# Patient Record
Sex: FEMALE | Race: WHITE | NOT HISPANIC OR LATINO | Employment: FULL TIME | URBAN - METROPOLITAN AREA
[De-identification: names, ages, dates, MRNs, and addresses within clinical notes are randomized per-mention and may not be internally consistent; named-entity substitution may affect disease eponyms.]

---

## 2018-10-08 ENCOUNTER — NON-PROVIDER VISIT (OUTPATIENT)
Dept: OCCUPATIONAL MEDICINE | Facility: CLINIC | Age: 38
End: 2018-10-08

## 2018-10-08 ENCOUNTER — DOCUMENTATION (OUTPATIENT)
Dept: OCCUPATIONAL MEDICINE | Facility: CLINIC | Age: 38
End: 2018-10-08

## 2018-10-08 DIAGNOSIS — Z02.89 VISIT FOR OCCUPATIONAL HEALTH EXAMINATION: ICD-10-CM

## 2018-10-08 DIAGNOSIS — Z02.1 PRE-EMPLOYMENT DRUG SCREENING: ICD-10-CM

## 2018-10-08 LAB
AMP AMPHETAMINE: NORMAL
BAR BARBITURATES: NORMAL
BZO BENZODIAZEPINES: NORMAL
COC COCAINE: NORMAL
INT CON NEG: NORMAL
INT CON POS: NORMAL
MDMA ECSTASY: NORMAL
MET METHAMPHETAMINES: NORMAL
MTD METHADONE: NORMAL
OPI OPIATES: NORMAL
OXY OXYCODONE: NORMAL
PCP PHENCYCLIDINE: NORMAL
POC URINE DRUG SCREEN OCDRS: NORMAL
THC: NORMAL

## 2018-10-08 PROCEDURE — 94375 RESPIRATORY FLOW VOLUME LOOP: CPT | Performed by: PREVENTIVE MEDICINE

## 2018-10-08 PROCEDURE — 80305 DRUG TEST PRSMV DIR OPT OBS: CPT | Performed by: PREVENTIVE MEDICINE

## 2019-03-07 ENCOUNTER — APPOINTMENT (OUTPATIENT)
Dept: RADIOLOGY | Facility: MEDICAL CENTER | Age: 39
End: 2019-03-07
Attending: EMERGENCY MEDICINE
Payer: COMMERCIAL

## 2019-03-07 ENCOUNTER — HOSPITAL ENCOUNTER (EMERGENCY)
Facility: MEDICAL CENTER | Age: 39
End: 2019-03-07
Attending: EMERGENCY MEDICINE
Payer: COMMERCIAL

## 2019-03-07 VITALS
BODY MASS INDEX: 29.44 KG/M2 | DIASTOLIC BLOOD PRESSURE: 48 MMHG | HEIGHT: 66 IN | SYSTOLIC BLOOD PRESSURE: 101 MMHG | OXYGEN SATURATION: 100 % | WEIGHT: 183.2 LBS | TEMPERATURE: 99 F | HEART RATE: 88 BPM | RESPIRATION RATE: 16 BRPM

## 2019-03-07 DIAGNOSIS — N92.0 MENORRHAGIA WITH REGULAR CYCLE: ICD-10-CM

## 2019-03-07 DIAGNOSIS — D50.9 IRON DEFICIENCY ANEMIA, UNSPECIFIED IRON DEFICIENCY ANEMIA TYPE: ICD-10-CM

## 2019-03-07 LAB
ABO GROUP BLD: NORMAL
ABO GROUP BLD: NORMAL
ANION GAP SERPL CALC-SCNC: 8 MMOL/L (ref 0–11.9)
ANISOCYTOSIS BLD QL SMEAR: ABNORMAL
BARCODED ABORH UBTYP: 9500
BARCODED PRD CODE UBPRD: NORMAL
BARCODED UNIT NUM UBUNT: NORMAL
BASOPHILS # BLD AUTO: 0.9 % (ref 0–1.8)
BASOPHILS # BLD: 0.06 K/UL (ref 0–0.12)
BLD GP AB SCN SERPL QL: NORMAL
BUN SERPL-MCNC: 15 MG/DL (ref 8–22)
CALCIUM SERPL-MCNC: 8.9 MG/DL (ref 8.5–10.5)
CHLORIDE SERPL-SCNC: 107 MMOL/L (ref 96–112)
CO2 SERPL-SCNC: 20 MMOL/L (ref 20–33)
COMPONENT R 8504R: NORMAL
CREAT SERPL-MCNC: 0.64 MG/DL (ref 0.5–1.4)
EKG IMPRESSION: NORMAL
EOSINOPHIL # BLD AUTO: 0 K/UL (ref 0–0.51)
EOSINOPHIL NFR BLD: 0 % (ref 0–6.9)
ERYTHROCYTE [DISTWIDTH] IN BLOOD BY AUTOMATED COUNT: 50.4 FL (ref 35.9–50)
GLUCOSE SERPL-MCNC: 130 MG/DL (ref 65–99)
HCG SERPL QL: NEGATIVE
HCT VFR BLD AUTO: 24.6 % (ref 37–47)
HGB BLD-MCNC: 6.6 G/DL (ref 12–16)
HYPOCHROMIA BLD QL SMEAR: ABNORMAL
LYMPHOCYTES # BLD AUTO: 0.78 K/UL (ref 1–4.8)
LYMPHOCYTES NFR BLD: 10.9 % (ref 22–41)
MANUAL DIFF BLD: NORMAL
MCH RBC QN AUTO: 19.9 PG (ref 27–33)
MCHC RBC AUTO-ENTMCNC: 26.8 G/DL (ref 33.6–35)
MCV RBC AUTO: 74.1 FL (ref 81.4–97.8)
MICROCYTES BLD QL SMEAR: ABNORMAL
MONOCYTES # BLD AUTO: 0.19 K/UL (ref 0–0.85)
MONOCYTES NFR BLD AUTO: 2.7 % (ref 0–13.4)
MORPHOLOGY BLD-IMP: NORMAL
MYELOCYTES NFR BLD MANUAL: 0.9 %
NEUTROPHILS # BLD AUTO: 6.09 K/UL (ref 2–7.15)
NEUTROPHILS NFR BLD: 84.6 % (ref 44–72)
NRBC # BLD AUTO: 0 K/UL
NRBC BLD-RTO: 0 /100 WBC
OVALOCYTES BLD QL SMEAR: NORMAL
PLATELET # BLD AUTO: 229 K/UL (ref 164–446)
PLATELET BLD QL SMEAR: NORMAL
POIKILOCYTOSIS BLD QL SMEAR: NORMAL
POTASSIUM SERPL-SCNC: 3.4 MMOL/L (ref 3.6–5.5)
PRODUCT TYPE UPROD: NORMAL
RBC # BLD AUTO: 3.32 M/UL (ref 4.2–5.4)
RBC BLD AUTO: PRESENT
RH BLD: NORMAL
RH BLD: NORMAL
SCHISTOCYTES BLD QL SMEAR: NORMAL
SODIUM SERPL-SCNC: 135 MMOL/L (ref 135–145)
TROPONIN I SERPL-MCNC: <0.01 NG/ML (ref 0–0.04)
UNIT STATUS USTAT: NORMAL
WBC # BLD AUTO: 7.2 K/UL (ref 4.8–10.8)

## 2019-03-07 PROCEDURE — 86901 BLOOD TYPING SEROLOGIC RH(D): CPT

## 2019-03-07 PROCEDURE — 93005 ELECTROCARDIOGRAM TRACING: CPT | Performed by: EMERGENCY MEDICINE

## 2019-03-07 PROCEDURE — 84703 CHORIONIC GONADOTROPIN ASSAY: CPT

## 2019-03-07 PROCEDURE — 86923 COMPATIBILITY TEST ELECTRIC: CPT

## 2019-03-07 PROCEDURE — 36430 TRANSFUSION BLD/BLD COMPNT: CPT

## 2019-03-07 PROCEDURE — 80048 BASIC METABOLIC PNL TOTAL CA: CPT

## 2019-03-07 PROCEDURE — 93005 ELECTROCARDIOGRAM TRACING: CPT

## 2019-03-07 PROCEDURE — 84484 ASSAY OF TROPONIN QUANT: CPT

## 2019-03-07 PROCEDURE — 86900 BLOOD TYPING SEROLOGIC ABO: CPT

## 2019-03-07 PROCEDURE — 700105 HCHG RX REV CODE 258: Performed by: EMERGENCY MEDICINE

## 2019-03-07 PROCEDURE — 71045 X-RAY EXAM CHEST 1 VIEW: CPT

## 2019-03-07 PROCEDURE — 85027 COMPLETE CBC AUTOMATED: CPT

## 2019-03-07 PROCEDURE — 86850 RBC ANTIBODY SCREEN: CPT

## 2019-03-07 PROCEDURE — P9016 RBC LEUKOCYTES REDUCED: HCPCS

## 2019-03-07 PROCEDURE — 85007 BL SMEAR W/DIFF WBC COUNT: CPT

## 2019-03-07 PROCEDURE — 99285 EMERGENCY DEPT VISIT HI MDM: CPT

## 2019-03-07 RX ORDER — FERROUS SULFATE 325(65) MG
325 TABLET ORAL DAILY
Qty: 30 TAB | Refills: 0 | Status: ON HOLD | OUTPATIENT
Start: 2019-03-07 | End: 2019-03-21

## 2019-03-07 RX ORDER — DOCUSATE SODIUM 100 MG/1
100 CAPSULE, LIQUID FILLED ORAL 2 TIMES DAILY
Qty: 60 CAP | Refills: 0 | Status: SHIPPED | OUTPATIENT
Start: 2019-03-07 | End: 2019-03-15 | Stop reason: CLARIF

## 2019-03-07 RX ORDER — SODIUM CHLORIDE 9 MG/ML
1000 INJECTION, SOLUTION INTRAVENOUS ONCE
Status: COMPLETED | OUTPATIENT
Start: 2019-03-07 | End: 2019-03-07

## 2019-03-07 RX ADMIN — SODIUM CHLORIDE 1000 ML: 9 INJECTION, SOLUTION INTRAVENOUS at 16:05

## 2019-03-07 ASSESSMENT — LIFESTYLE VARIABLES: DO YOU DRINK ALCOHOL: NO

## 2019-03-07 NOTE — ED NOTES
Pt ambulates to triage with c/c SOB & dizziness.  Pt reports similar feeling with low H&H due to heavy periods years ago.  A&ox4.  Pt to room 65.

## 2019-03-08 NOTE — ED PROVIDER NOTES
"ED Provider Note    CHIEF COMPLAINT  Chief Complaint   Patient presents with   • Shortness of Breath     started today    • Dizziness       HPI  Stacey Francisco is a 39 y.o. female who presents with dizziness and shortness of breath.  Patient is a history of anemia related to heavy menses.  She is never been transfused.  She has had a hemoglobin as low as 7 in the past.  She started menstruating 4 days ago.  This is current.  This is a typical heavy period for her.  Today she went to [facility].  She felt short of breath and weak.  She ended up not skiing and came to the hospital because she felt so bad.  She is felt lightheaded and things going dark like she might pass out.    REVIEW OF SYSTEMS  As per HPI, otherwise a 10 point review of systems is negative    PAST MEDICAL HISTORY  Menorrhagia  Anemia    SOCIAL HISTORY  She is a Nurse    SURGICAL HISTORY  No past surgical history on file.    CURRENT MEDICATIONS  Home Medications     Reviewed by Janice Tinoco R.N. (Registered Nurse) on 03/07/19 at 1449  Med List Status: Not Addressed   Medication Last Dose Status        Patient Gerry Taking any Medications                       ALLERGIES  No Known Allergies    PHYSICAL EXAM  VITAL SIGNS: /80   Pulse (!) 121   Temp 36.2 °C (97.2 °F) (Temporal)   Resp 20   Ht 1.676 m (5' 6\")   Wt 83.1 kg (183 lb 3.2 oz)   SpO2 100%   BMI 29.57 kg/m²    Constitutional: Awake and alert  HENT:  Atraumatic, Normocephalic.Oropharynx dry mucus membranes, Nose normal inspection.   Eyes: Normal inspection  Neck: Supple  Cardiovascular: Tachycardia heart rate, Normal rhythm.  Symmetric peripheral pulses.   Thorax & Lungs: No respiratory distress, No wheezing, No rales, No rhonchi, No chest tenderness.   Abdomen: Bowel sounds normal, soft, non-distended, nontender, no mass  Skin: Warm, Dry, No rash.   Back: No tenderness, No CVA tenderness.   Extremities: No clubbing, cyanosis, edema, no Homans or cords   Neurologic: Grossly " normal   Psychiatric: Anxious appearing    RADIOLOGY/PROCEDURES  DX-CHEST-PORTABLE (1 VIEW)   Final Result      No acute cardiopulmonary disease evident.           Imaging is interpreted by radiologist    Labs:  Results for orders placed or performed during the hospital encounter of 03/07/19   HCG QUAL SERUM   Result Value Ref Range    Beta-Hcg Qualitative Serum Negative Negative   CBC WITH DIFFERENTIAL   Result Value Ref Range    WBC 7.2 4.8 - 10.8 K/uL    RBC 3.32 (L) 4.20 - 5.40 M/uL    Hemoglobin 6.6 (L) 12.0 - 16.0 g/dL    Hematocrit 24.6 (L) 37.0 - 47.0 %    MCV 74.1 (L) 81.4 - 97.8 fL    MCH 19.9 (L) 27.0 - 33.0 pg    MCHC 26.8 (L) 33.6 - 35.0 g/dL    RDW 50.4 (H) 35.9 - 50.0 fL    Platelet Count 229 164 - 446 K/uL    Neutrophils-Polys 84.60 (H) 44.00 - 72.00 %    Lymphocytes 10.90 (L) 22.00 - 41.00 %    Monocytes 2.70 0.00 - 13.40 %    Eosinophils 0.00 0.00 - 6.90 %    Basophils 0.90 0.00 - 1.80 %    Nucleated RBC 0.00 /100 WBC    Neutrophils (Absolute) 6.09 2.00 - 7.15 K/uL    Lymphs (Absolute) 0.78 (L) 1.00 - 4.80 K/uL    Monos (Absolute) 0.19 0.00 - 0.85 K/uL    Eos (Absolute) 0.00 0.00 - 0.51 K/uL    Baso (Absolute) 0.06 0.00 - 0.12 K/uL    NRBC (Absolute) 0.00 K/uL    Hypochromia 1+     Anisocytosis 1+     Microcytosis 1+    BASIC METABOLIC PANEL   Result Value Ref Range    Sodium 135 135 - 145 mmol/L    Potassium 3.4 (L) 3.6 - 5.5 mmol/L    Chloride 107 96 - 112 mmol/L    Co2 20 20 - 33 mmol/L    Glucose 130 (H) 65 - 99 mg/dL    Bun 15 8 - 22 mg/dL    Creatinine 0.64 0.50 - 1.40 mg/dL    Calcium 8.9 8.5 - 10.5 mg/dL    Anion Gap 8.0 0.0 - 11.9   TROPONIN   Result Value Ref Range    Troponin I <0.01 0.00 - 0.04 ng/mL   ESTIMATED GFR   Result Value Ref Range    GFR If African American >60 >60 mL/min/1.73 m 2    GFR If Non African American >60 >60 mL/min/1.73 m 2   DIFFERENTIAL MANUAL   Result Value Ref Range    Myelocytes 0.90 %    Manual Diff Status PERFORMED    PERIPHERAL SMEAR REVIEW   Result Value  Ref Range    Peripheral Smear Review see below    PLATELET ESTIMATE   Result Value Ref Range    Plt Estimation Normal    MORPHOLOGY   Result Value Ref Range    RBC Morphology Present     Poikilocytosis 2+     Ovalocytes 2+     Schistocytes 1+    EKG (NOW)   Result Value Ref Range    Report       [facility]    Test Date:  2019  Pt Name:    ALICJA DEL ROSARIO               Department: ER  MRN:        [id number]                      Room:  Gender:     Female                       Technician: [id number]  :        1980                   Requested By:ER TRIAGE PROTOCOL  Order #:    [id number]                    Reading MD: QIANA SOLER MD    Measurements  Intervals                                Axis  Rate:       121                          P:          72  KS:         148                          QRS:        -12  QRSD:       76                           T:          56  QT:         324  QTc:        460    Interpretive Statements  SINUS TACHYCARDIA  No previous ECG available for comparison    Electronically Signed On 3-7-2019 16:33:30 PST by QIANA SOLER MD         Medications   NS infusion 1,000 mL (1,000 mL Intravenous New Bag 3/7/19 1605)       HYDRATION: Based on the patient's presentation of Tachycardia the patient was given IV fluids. IV Hydration was used because oral hydration was not as rapid as required. Upon recheck following hydration, the patient was Improved.    COURSE & MEDICAL DECISION MAKING  Patient presents to the ER with history of heavy menstrual bleeding and now symptoms most consistent with anemia.  She was tachycardic.  She was hydrated with normal saline.  Workup was undertaken with regards to her symptoms.  She was not identified to have alternative pathology, but was noted to have remarkable anemia.    This is a chronic problem for the patient.  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  She has a gynecologist in [place].  She had a pelvic ultrasound a few  months ago that just showed a small ovarian cyst.  She has considered ablation.  She has tried hormones in the past, but she has not tolerated any of these because of side effects.  She is not interested in additional hormone therapy.  Because of her symptoms and the severity of her anemia a transfusion is recommended.    I have explained to the patient the risks and benefits of transfusion of blood products.  This includes, as appropriate, the risk of mild allergic reaction, hemolytic reaction, transfusion-associated lung injury, febrile reactions, circulatory or iron overload, and infection.    We discussed possible alternatives and their risks, including directed donation, autologous transfusion, and no transfusion, including IV or oral iron supplementation, as appropriate.  I believe the patient understands the risks and benefits and was able to express understanding.    I ordered 1 unit of packed red blood cells to be transfused.  Patient will be prescribed oral iron supplementation and Colace.  Next month she plans to go back home.  She will make an appointment with her gynecologist.  She has discussed ablation in the past with him.  I have given precautions for the patient to return the ER for recurrent symptoms, difficult he breathing, fevers or concern.      FINAL IMPRESSION  1.  Critical anemia requiring transfusion secondary to menorrhagia        This dictation was created using voice recognition software. The accuracy of the dictation is limited to the abilities of the software.  The nursing notes were reviewed and certain aspects of this information were incorporated into this note.      Electronically signed by: Willie Thakur, 3/7/2019 4:04 PM

## 2019-03-08 NOTE — ED NOTES
Report from Niurka CLIFFORD. Patient receiving blood transfusion at this time. On monitor, up for DC after blood transfusion complete

## 2019-03-08 NOTE — ED NOTES
Pt blood infusing at this time without difficulty.  No S&S of infiltration, overload, or reactions.  Will continue to monitor.  Pt tolerating without difficulty.

## 2019-03-08 NOTE — ED NOTES
Pt verbalizes understanding of discharge and follow-up instructions.  PIV removed.  Given Rx X2.  VSS.  All questions answered. When patient stood, she appeared a little unsteady on her feet and stated that she was a little dizzy. I advised patient to sit down and get herself straight, she did so and stated that she is feeling better and says that she is ok to go home and does not want to stay. Vital signs re-checked, all WNL. Advised patient to check back in if she finds it too difficult to get to her car and to pull over immediately if she begins to feel dizzy or has tunnel vision while she is driving. Patient verbalizes understanding of these instructions and insists that she is comfortable going home in her current condition.

## 2019-03-08 NOTE — ED NOTES
Patient updated on plan of care.  Pt IV infusing at this time without difficulty.  No S&S of infiltration, overload, or reactions.  Will continue to monitor.

## 2019-03-10 ENCOUNTER — HOSPITAL ENCOUNTER (EMERGENCY)
Facility: MEDICAL CENTER | Age: 39
End: 2019-03-10
Attending: EMERGENCY MEDICINE
Payer: COMMERCIAL

## 2019-03-10 VITALS
HEIGHT: 66 IN | TEMPERATURE: 98.5 F | SYSTOLIC BLOOD PRESSURE: 91 MMHG | RESPIRATION RATE: 18 BRPM | BODY MASS INDEX: 29.09 KG/M2 | WEIGHT: 181 LBS | DIASTOLIC BLOOD PRESSURE: 42 MMHG | HEART RATE: 85 BPM | OXYGEN SATURATION: 98 %

## 2019-03-10 DIAGNOSIS — D64.9 SYMPTOMATIC ANEMIA: ICD-10-CM

## 2019-03-10 DIAGNOSIS — N93.8 DYSFUNCTIONAL UTERINE BLEEDING: ICD-10-CM

## 2019-03-10 LAB
ABO GROUP BLD: NORMAL
BARCODED ABORH UBTYP: 5100
BARCODED ABORH UBTYP: 5100
BARCODED PRD CODE UBPRD: NORMAL
BARCODED PRD CODE UBPRD: NORMAL
BARCODED UNIT NUM UBUNT: NORMAL
BARCODED UNIT NUM UBUNT: NORMAL
BASOPHILS # BLD AUTO: 0.4 % (ref 0–1.8)
BASOPHILS # BLD: 0.03 K/UL (ref 0–0.12)
BLD GP AB SCN SERPL QL: NORMAL
COMPONENT R 8504R: NORMAL
COMPONENT R 8504R: NORMAL
EOSINOPHIL # BLD AUTO: 0.14 K/UL (ref 0–0.51)
EOSINOPHIL NFR BLD: 1.7 % (ref 0–6.9)
ERYTHROCYTE [DISTWIDTH] IN BLOOD BY AUTOMATED COUNT: 54 FL (ref 35.9–50)
HCG SERPL QL: NEGATIVE
HCT VFR BLD AUTO: 24.5 % (ref 37–47)
HGB BLD-MCNC: 6.7 G/DL (ref 12–16)
IMM GRANULOCYTES # BLD AUTO: 0.02 K/UL (ref 0–0.11)
IMM GRANULOCYTES NFR BLD AUTO: 0.2 % (ref 0–0.9)
LYMPHOCYTES # BLD AUTO: 2.03 K/UL (ref 1–4.8)
LYMPHOCYTES NFR BLD: 24.1 % (ref 22–41)
MCH RBC QN AUTO: 20.6 PG (ref 27–33)
MCHC RBC AUTO-ENTMCNC: 27.3 G/DL (ref 33.6–35)
MCV RBC AUTO: 75.4 FL (ref 81.4–97.8)
MONOCYTES # BLD AUTO: 1.01 K/UL (ref 0–0.85)
MONOCYTES NFR BLD AUTO: 12 % (ref 0–13.4)
NEUTROPHILS # BLD AUTO: 5.21 K/UL (ref 2–7.15)
NEUTROPHILS NFR BLD: 61.6 % (ref 44–72)
NRBC # BLD AUTO: 0 K/UL
NRBC BLD-RTO: 0 /100 WBC
PLATELET # BLD AUTO: 228 K/UL (ref 164–446)
PRODUCT TYPE UPROD: NORMAL
PRODUCT TYPE UPROD: NORMAL
RBC # BLD AUTO: 3.25 M/UL (ref 4.2–5.4)
RH BLD: NORMAL
UNIT STATUS USTAT: NORMAL
UNIT STATUS USTAT: NORMAL
WBC # BLD AUTO: 8.4 K/UL (ref 4.8–10.8)

## 2019-03-10 PROCEDURE — 84703 CHORIONIC GONADOTROPIN ASSAY: CPT

## 2019-03-10 PROCEDURE — 86850 RBC ANTIBODY SCREEN: CPT

## 2019-03-10 PROCEDURE — 86900 BLOOD TYPING SEROLOGIC ABO: CPT

## 2019-03-10 PROCEDURE — P9016 RBC LEUKOCYTES REDUCED: HCPCS | Mod: 91

## 2019-03-10 PROCEDURE — 700105 HCHG RX REV CODE 258: Performed by: EMERGENCY MEDICINE

## 2019-03-10 PROCEDURE — 36415 COLL VENOUS BLD VENIPUNCTURE: CPT

## 2019-03-10 PROCEDURE — 86901 BLOOD TYPING SEROLOGIC RH(D): CPT

## 2019-03-10 PROCEDURE — 700111 HCHG RX REV CODE 636 W/ 250 OVERRIDE (IP)

## 2019-03-10 PROCEDURE — 99285 EMERGENCY DEPT VISIT HI MDM: CPT

## 2019-03-10 PROCEDURE — 85025 COMPLETE CBC W/AUTO DIFF WBC: CPT

## 2019-03-10 PROCEDURE — 86923 COMPATIBILITY TEST ELECTRIC: CPT

## 2019-03-10 PROCEDURE — 96374 THER/PROPH/DIAG INJ IV PUSH: CPT

## 2019-03-10 PROCEDURE — 36430 TRANSFUSION BLD/BLD COMPNT: CPT

## 2019-03-10 RX ORDER — SODIUM CHLORIDE 9 MG/ML
INJECTION, SOLUTION INTRAVENOUS CONTINUOUS
Status: DISCONTINUED | OUTPATIENT
Start: 2019-03-10 | End: 2019-03-10 | Stop reason: HOSPADM

## 2019-03-10 RX ORDER — ACETAMINOPHEN 650 MG/1
SUPPOSITORY RECTAL
Status: DISCONTINUED
Start: 2019-03-10 | End: 2019-03-10 | Stop reason: HOSPADM

## 2019-03-10 RX ORDER — ONDANSETRON 2 MG/ML
4 INJECTION INTRAMUSCULAR; INTRAVENOUS ONCE
Status: COMPLETED | OUTPATIENT
Start: 2019-03-10 | End: 2019-03-10

## 2019-03-10 RX ADMIN — ONDANSETRON 4 MG: 2 INJECTION INTRAMUSCULAR; INTRAVENOUS at 03:45

## 2019-03-10 RX ADMIN — SODIUM CHLORIDE: 9 INJECTION, SOLUTION INTRAVENOUS at 01:45

## 2019-03-10 NOTE — ED PROVIDER NOTES
"ED Provider Note    Scribed for Lynn Angulo M.D. by Mary Panda. 3/10/2019  1:19 AM    Means of arrival: Walk-in  History obtained from: Patient  History limited by: None      CHIEF COMPLAINT  Chief Complaint   Patient presents with   • Dizziness       HPI  Ana Crisosotmo is a 39 y.o. Female with a history of menorrhagia who presents to the Emergency Department for dizziness onset one week ago. Patient reports she is passing blood clots, however at bedside, she notes less clotting as compared to earlier this week. She endorses shortness of breath upon exertion. 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 She states she took iron supplements as needed.     Patient was seen here two days ago for lightheadedness and tachycardia, and was found to be anemic with hemoglobin of 6.6. She was then transfused with one unit of blood. At bedside, patient is still symptomatic and heavily bleeding. She is negative for fever.    REVIEW OF SYSTEMS  Pertinent positive include menorrhagia, dizziness, shortness of breath upon exertion. Pertinent negative include fever. All other systems reviewed and are negative.      PAST MEDICAL HISTORY   has a past medical history of Anemia.    SOCIAL HISTORY  No social history noted.     SURGICAL HISTORY  patient denies any surgical history    CURRENT MEDICATIONS  Home Medications     Reviewed by Marcelle Collado R.N. (Registered Nurse) on 03/10/19 at 0108  Med List Status: <None>   Medication Last Dose Status   docusate sodium (COLACE) 100 MG Cap  Active   ferrous sulfate 325 (65 Fe) MG tablet  Active                ALLERGIES  No Known Allergies    PHYSICAL EXAM   VITAL SIGNS: /61   Pulse 89   Temp 36.9 °C (98.4 °F) (Temporal)   Resp 17   Ht 1.676 m (5' 6\")   Wt 82.1 kg (181 lb)   SpO2 99%   BMI 29.21 kg/m²      Constitutional: Alert in no " apparent distress.  HENT: Normocephalic, Atraumatic. Bilateral external ears normal. Nose normal.  Moist mucous membranes.  Oropharynx clear.  Eyes: Pupils are equal and reactive. Conjunctiva normal.   Neck: Supple, full range of motion  Heart: Regular rate and rhythm.  No murmurs.    Lungs: No respiratory distress, normal work of breathing. Lungs clear to auscultation bilaterally.  Abdomen Soft, no distention.  No tenderness to palpation.  Musculoskeletal: Atraumatic. No obvious deformities noted.  No lower extremity edema.  Skin: Warm, Dry.  Pale. No erythema, No rash.   Neurologic: Alert and oriented x3. Moving all extremities spontaneously without focal deficits.  Psychiatric: Affect normal, Mood normal, Appears appropriate and not intoxicated.      DIAGNOSTIC STUDIES    LABS  Personally reviewed by me  Labs Reviewed   CBC WITH DIFFERENTIAL - Abnormal; Notable for the following:        Result Value    RBC 3.25 (*)     Hemoglobin 6.7 (*)     Hematocrit 24.5 (*)     MCV 75.4 (*)     MCH 20.6 (*)     MCHC 27.3 (*)     RDW 54.0 (*)     Monos (Absolute) 1.01 (*)     All other components within normal limits   COD (ADULT)   HCG QUAL SERUM   RELEASE RED BLOOD CELLS   RELEASE RED BLOOD CELLS   TRANSFUSE RED BLOOD CELLS-NURSING COMMUNICATION   TRANSFUSE RED BLOOD CELLS-NURSING COMMUNICATION       ED COURSE  Vitals:    03/10/19 0430 03/10/19 0433 03/10/19 0500 03/10/19 0530   BP:  (!) 91/42     Pulse: 85 87 79 85   Resp: (!) 81 18     Temp:  36.9 °C (98.5 °F)     TempSrc:       SpO2: 97% 98% 97% 98%   Weight:       Height:             Medications administered:  Medications   NS infusion ( Intravenous Stopped 3/10/19 0300)   ACETAMINOPHEN 650 MG LA SUPP (not administered)   ondansetron (ZOFRAN) syringe/vial injection 4 mg (4 mg Intravenous Given 3/10/19 0345)     Patient was given IV fluids for possible dehydration.  IV hydration was used because oral hydration was not adequate alone.  Following fluid administration  patient's symptoms and vital signs were improved.    Old records personally reviewed:  Patient was seen here two days ago for lightheadedness and tachycardia, and was found to be anemic with hemoglobin of 6.6. Cardiac workup was unremarkable.  She was then transfused with one unit of blood.    1:19 AM Patient seen and examined at bedside. Ordered for CBC, COD, NS infusion and HCG qualitative serum to evaluate. Patient will be treated with acetaminophen 650 mg, Zofran 4 mg, and NS infusion for her symptoms.     MEDICAL DECISION MAKING  Patient with history of menorrhagia who presents with continued lightheadedness and shortness of breath after recently being seen and transfused due to low blood counts.  She is afebrile with reassuring vitals on arrival.  Abdominal exam is benign without concern for ovarian torsion, TOA, PID.  Labs demonstrate continued anemia despite transfusion 2 days prior.  Patient is not pregnant.  Plan for further transfusion and placement on Aygestin with close follow up with gynecology.      5:21 AM - Upon reassessment following transfusion, patient is resting comfortably with normal vital signs.  No new complaints at this time.  Discussed results with patient and/or family as well as importance of primary care follow up.  Patient understands plan of care and strict return precautions for new or changing symptoms.     I discussed the patient with Dr. Donato, gynecology who will follow the patient up in her clinic.     DISPOSITION:  Patient will be discharged home in stable condition.    FOLLOW UP:  Miranda Donato M.D.  [address]  [facility] [address] [phone]    Schedule an appointment as soon as possible for a visit       [facility], Emergency Dept  [address] [address] [phone]    If symptoms worsen      OUTPATIENT MEDICATIONS:  Discharge Medication List as of 3/10/2019  5:49 AM      START taking these medications    Details    norethindrone (AYGESTIN) 5 MG tablet Take 2 Tabs by mouth every day for 10 days., Disp-20 Tab, R-0, Print Rx Paper             IMPRESSION  (D64.9) Symptomatic anemia  (N93.8) Dysfunctional uterine bleeding    Results, diagnoses, and treatment options were discussed with the patient and/or family. Patient verbalized understanding of plan of care.    Discharge Medication List as of 3/10/2019  5:49 AM      START taking these medications    Details   norethindrone (AYGESTIN) 5 MG tablet Take 2 Tabs by mouth every day for 10 days., Disp-20 Tab, R-0, Print Rx Paper                  IMary (Scribe), am scribing for, and in the presence of, Lynn Angulo M.D..    Electronically signed by: Mary Panda (Scribe), 3/10/2019    ILynn M.D. personally performed the services described in this documentation, as scribed by Mary Panda in my presence, and it is both accurate and complete. C.     The note accurately reflects work and decisions made by me.  Lynn Angulo  3/10/2019  7:08 PM

## 2019-03-10 NOTE — ED TRIAGE NOTES
"Chief Complaint   Patient presents with   • Dizziness     Blood pressure 120/61, pulse 89, temperature 36.9 °C (98.4 °F), temperature source Temporal, resp. rate 17, height 1.676 m (5' 6\"), weight 82.1 kg (181 lb), SpO2 99 %.      Pt ambulatory to [place] [id number], received blood transfusion approx two days ago due to low h/h.   "

## 2019-03-12 ENCOUNTER — APPOINTMENT (OUTPATIENT)
Dept: RADIOLOGY | Facility: MEDICAL CENTER | Age: 39
End: 2019-03-12
Attending: EMERGENCY MEDICINE
Payer: COMMERCIAL

## 2019-03-12 ENCOUNTER — HOSPITAL ENCOUNTER (EMERGENCY)
Facility: MEDICAL CENTER | Age: 39
End: 2019-03-12
Attending: EMERGENCY MEDICINE
Payer: COMMERCIAL

## 2019-03-12 VITALS
DIASTOLIC BLOOD PRESSURE: 70 MMHG | WEIGHT: 180 LBS | HEIGHT: 66 IN | HEART RATE: 89 BPM | OXYGEN SATURATION: 97 % | BODY MASS INDEX: 28.93 KG/M2 | TEMPERATURE: 97.7 F | RESPIRATION RATE: 18 BRPM | SYSTOLIC BLOOD PRESSURE: 119 MMHG

## 2019-03-12 DIAGNOSIS — N92.0 MENORRHAGIA WITH REGULAR CYCLE: ICD-10-CM

## 2019-03-12 LAB
ANION GAP SERPL CALC-SCNC: 10 MMOL/L (ref 0–11.9)
ANISOCYTOSIS BLD QL SMEAR: ABNORMAL
BASOPHILS # BLD AUTO: 0.2 % (ref 0–1.8)
BASOPHILS # BLD: 0.02 K/UL (ref 0–0.12)
BUN SERPL-MCNC: 11 MG/DL (ref 8–22)
CALCIUM SERPL-MCNC: 8.7 MG/DL (ref 8.5–10.5)
CHLORIDE SERPL-SCNC: 107 MMOL/L (ref 96–112)
CO2 SERPL-SCNC: 20 MMOL/L (ref 20–33)
COMMENT 1642: NORMAL
CREAT SERPL-MCNC: 0.7 MG/DL (ref 0.5–1.4)
DACRYOCYTES BLD QL SMEAR: NORMAL
EOSINOPHIL # BLD AUTO: 0.05 K/UL (ref 0–0.51)
EOSINOPHIL NFR BLD: 0.6 % (ref 0–6.9)
ERYTHROCYTE [DISTWIDTH] IN BLOOD BY AUTOMATED COUNT: 60 FL (ref 35.9–50)
GIANT PLATELETS BLD QL SMEAR: NORMAL
GLUCOSE SERPL-MCNC: 85 MG/DL (ref 65–99)
HCT VFR BLD AUTO: 28.7 % (ref 37–47)
HGB BLD-MCNC: 8.2 G/DL (ref 12–16)
IMM GRANULOCYTES # BLD AUTO: 0.03 K/UL (ref 0–0.11)
IMM GRANULOCYTES NFR BLD AUTO: 0.4 % (ref 0–0.9)
LYMPHOCYTES # BLD AUTO: 1.41 K/UL (ref 1–4.8)
LYMPHOCYTES NFR BLD: 16.9 % (ref 22–41)
MACROCYTES BLD QL SMEAR: ABNORMAL
MCH RBC QN AUTO: 22.6 PG (ref 27–33)
MCHC RBC AUTO-ENTMCNC: 28.6 G/DL (ref 33.6–35)
MCV RBC AUTO: 79.1 FL (ref 81.4–97.8)
MICROCYTES BLD QL SMEAR: ABNORMAL
MONOCYTES # BLD AUTO: 0.54 K/UL (ref 0–0.85)
MONOCYTES NFR BLD AUTO: 6.5 % (ref 0–13.4)
MORPHOLOGY BLD-IMP: NORMAL
NEUTROPHILS # BLD AUTO: 6.3 K/UL (ref 2–7.15)
NEUTROPHILS NFR BLD: 75.4 % (ref 44–72)
NRBC # BLD AUTO: 0 K/UL
NRBC BLD-RTO: 0 /100 WBC
OVALOCYTES BLD QL SMEAR: NORMAL
PLATELET # BLD AUTO: 211 K/UL (ref 164–446)
PLATELET BLD QL SMEAR: NORMAL
POIKILOCYTOSIS BLD QL SMEAR: NORMAL
POTASSIUM SERPL-SCNC: 3.2 MMOL/L (ref 3.6–5.5)
RBC # BLD AUTO: 3.63 M/UL (ref 4.2–5.4)
RBC BLD AUTO: PRESENT
SODIUM SERPL-SCNC: 137 MMOL/L (ref 135–145)
WBC # BLD AUTO: 8.4 K/UL (ref 4.8–10.8)

## 2019-03-12 PROCEDURE — 76856 US EXAM PELVIC COMPLETE: CPT

## 2019-03-12 PROCEDURE — 36415 COLL VENOUS BLD VENIPUNCTURE: CPT

## 2019-03-12 PROCEDURE — 99284 EMERGENCY DEPT VISIT MOD MDM: CPT

## 2019-03-12 PROCEDURE — 700111 HCHG RX REV CODE 636 W/ 250 OVERRIDE (IP): Performed by: EMERGENCY MEDICINE

## 2019-03-12 PROCEDURE — 80048 BASIC METABOLIC PNL TOTAL CA: CPT

## 2019-03-12 PROCEDURE — 96374 THER/PROPH/DIAG INJ IV PUSH: CPT

## 2019-03-12 PROCEDURE — 85025 COMPLETE CBC W/AUTO DIFF WBC: CPT

## 2019-03-12 RX ORDER — ONDANSETRON 2 MG/ML
4 INJECTION INTRAMUSCULAR; INTRAVENOUS ONCE
Status: COMPLETED | OUTPATIENT
Start: 2019-03-12 | End: 2019-03-12

## 2019-03-12 RX ORDER — SODIUM CHLORIDE 9 MG/ML
1000 INJECTION, SOLUTION INTRAVENOUS ONCE
Status: DISCONTINUED | OUTPATIENT
Start: 2019-03-12 | End: 2019-03-12 | Stop reason: HOSPADM

## 2019-03-12 RX ORDER — TRANEXAMIC ACID 650 MG/1
2 TABLET ORAL 3 TIMES DAILY
Qty: 30 TAB | Refills: 0 | Status: ON HOLD | OUTPATIENT
Start: 2019-03-12 | End: 2019-03-21

## 2019-03-12 RX ADMIN — ONDANSETRON 4 MG: 2 INJECTION INTRAMUSCULAR; INTRAVENOUS at 18:49

## 2019-03-13 NOTE — ED NOTES
Pt updated on blood work results. Refused IV fluids at this time.  VS reassessed. Pt denies any pain at this time.  Pt is A&XO4, resp even and unlabored, skin w/d.  Pt is fatigued but in no distress.  Side rails up, call light within reach.  Given water and warm blankets per request. WCTM closely.

## 2019-03-13 NOTE — ED PROVIDER NOTES
ED Provider Note    Scribed for Sandy Perdue M.D. by Berto Leroy. 3/12/2019, 5:00 PM.    Primary care provider: No primary care provider on file.  Means of arrival: walk in  History obtained from: patient  History limited by: none    CHIEF COMPLAINT  Chief Complaint   Patient presents with   • Fatigue   • Shortness of Breath       HPI  Stacey Francisco is a 39 y.o. female who presents to the Emergency Department complaining of persistent vaginal bleeding for the last 12 days. She describes her bleeding as waxing and waning in severity, but constant. Patient reports associated generalized weakenss, exertional shortness of breath. She states that she started bleeding the last day of February that persisted for 6 days with increasing associated symptoms. Patient was evaluated at [facility] 5 days ago and found to be anemic at 6.6. She was treated with a unit of blood and discharged home on iron. Patient reports that her symptoms continued so she was reevaluated 3 days ago and found to be anemic again at 6.7. She was treated with 2 units of blood and discharged home on a course of progesterone. Patient reports that her symptoms have persisted so she contacted her GYN who recommended that she come to the ED for reevaluation. She reports a history of heavy periods, but has never had vaginal bleeding for this long. She does not take oral birth control and has previously had severe bleeding after receiving the Depo shot. Patient denies fever, history of fibroids.     REVIEW OF SYSTEMS  Pertinent positives include vaginal bleeding, shortness of breath, weakness. Pertinent negatives include no fever.  All other systems reviewed and negative.    PAST MEDICAL HISTORY   has a past medical history of Anemia.    SURGICAL HISTORY  patient denies any surgical history    SOCIAL HISTORY  Social History   Substance Use Topics   • Smoking status: No   • Smokeless tobacco: No   • Alcohol use No      FAMILY HISTORY  No family  "history of fibroids.     CURRENT MEDICATIONS  Current Outpatient Prescriptions:   •  norethindrone (AYGESTIN) 5 MG tablet, Take 2 Tabs by mouth every day for 10 days., Disp: 20 Tab, Rfl: 0  •  ferrous sulfate 325 (65 Fe) MG tablet, Take 1 Tab by mouth every day., Disp: 30 Tab, Rfl: 0  •  docusate sodium (COLACE) 100 MG Cap, Take 1 Cap by mouth 2 times a day., Disp: 60 Cap, Rfl: 0    ALLERGIES  No Known Allergies    PHYSICAL EXAM  VITAL SIGNS: /78   Pulse (!) 117   Temp 36.5 °C (97.7 °F) (Temporal)   Resp 20   Ht 1.676 m (5' 6\")   SpO2 100%   BMI 29.21 kg/m²   Constitutional: Alert in no apparent distress.  HENT: No signs of trauma, Bilateral external ears normal, Nose normal.   Eyes: Pupils are equal and reactive, Conjunctiva normal, Non-icteric.   Neck: Normal range of motion, No tenderness, Supple, No stridor.   Cardiovascular: Tachycardic. Regular rhythm, no murmurs.   Thorax & Lungs: Normal breath sounds, No respiratory distress, No wheezing, No chest tenderness.   Abdomen: Bowel sounds normal, Soft, No tenderness, No masses, No peritoneal signs.  Skin: Warm, Dry, No erythema, No rash.   Musculoskeletal:  No major deformities noted.   Neurologic: Alert, moving all extremities without difficulty, no focal deficits.    LABS  Labs Reviewed   CBC WITH DIFFERENTIAL - Abnormal; Notable for the following:        Result Value    RBC 3.63 (*)     Hemoglobin 8.2 (*)     Hematocrit 28.7 (*)     MCV 79.1 (*)     MCH 22.6 (*)     MCHC 28.6 (*)     RDW 60.0 (*)     Neutrophils-Polys 75.40 (*)     Lymphocytes 16.90 (*)     All other components within normal limits   BASIC METABOLIC PANEL - Abnormal; Notable for the following:     Potassium 3.2 (*)     All other components within normal limits   ESTIMATED GFR   PERIPHERAL SMEAR REVIEW   PLATELET ESTIMATE   MORPHOLOGY   DIFFERENTIAL COMMENT   All labs reviewed by me.    RADIOLOGY  US-PELVIC COMPLETE (TRANSABDOMINAL/TRANSVAGINAL) (COMBO)   Final Result      1.  There " is a uterine body fibroid which appears to abut the endometrial complex      The radiologist's interpretation of all radiological studies have been reviewed by me.    COURSE & MEDICAL DECISION MAKING  Pertinent Labs & Imaging studies reviewed. (See chart for details)    Differential diagnoses include but are not limited to: Anemia heavy vaginal bleeding fibroids    5:00 PM - Patient seen and examined at bedside. Discussed her evaluation and treatment in the ED. Patient will be treated with NS 1000 ml for tachycardia, anemia, bleeding for 12 days. Ordered US pelvic, CBC with differential, BMP to evaluate her symptoms.     6:59 PM  Labs are reviewed.  She has a hemoglobin of 8.2.  Ultrasound shows a uterine fibroid which appears to abut the endometrial complex which may be responsible for the significant bleeding.  I spoke with Dr. Tejeda on-call for GYN.  He recommended Lysteda/TXA p.o. to reduce bleeding.  He recommended outpatient follow-up with Dr. Donato to schedule hysterectomy and will be in contact with her regarding this.  He did not feel that admission would greatly expedite that.  Given that her hemoglobin is 8.2 I do not think she requires additional transfusion.  She will be given a prescription for the TXA and an excuse for work given that she is still quite orthostatic.  She will be discharged to follow-up with outpatient GYN.     The patient will return for new or worsening symptoms and is stable at the time of discharge. Patient was given return precautions. Patient and/or family member verbalizes understanding and will comply.    DISPOSITION:  Patient will be discharged home in stable condition.    FOLLOW UP:  Miranda Donato M.D.  [address]  [facility] [address]  [facility] [id number] [phone]      make appointment    [facility], Emergency Dept  [address] [address] [phone]    Return for worsening bleeding, worsening symptoms or other  concerns      OUTPATIENT MEDICATIONS:  New Prescriptions    TRANEXAMIC ACID (LYSTEDA) 650 MG TAB    Take 2 Tabs by mouth 3 times a day for 5 days.         FINAL IMPRESSION  1. Menorrhagia with regular cycle         This dictation has been created using voice recognition software and/or scribes. The accuracy of the dictation is limited by the abilities of the software and the expertise of the scribes. I expect there may be some errors of grammar and possibly content. I made every attempt to manually correct the errors within my dictation. However, errors related to voice recognition software and/or scribes may still exist and should be interpreted within the appropriate context.     IBerto (Scribe), am scribing for, and in the presence of, Sandy Perdue M.D..    Electronically signed by: Berto Leroy (Scribe), 3/12/2019    ISandy M.D. personally performed the services described in this documentation, as scribed by Berto Leroy in my presence, and it is both accurate and complete. C    The note accurately reflects work and decisions made by me.  Sandy Perdue  3/12/2019  7:01 PM

## 2019-03-15 ENCOUNTER — ANESTHESIA (OUTPATIENT)
Dept: SURGERY | Facility: MEDICAL CENTER | Age: 39
DRG: 742 | End: 2019-03-15
Payer: COMMERCIAL

## 2019-03-15 ENCOUNTER — HOSPITAL ENCOUNTER (INPATIENT)
Facility: MEDICAL CENTER | Age: 39
LOS: 6 days | DRG: 742 | End: 2019-03-21
Attending: EMERGENCY MEDICINE | Admitting: OBSTETRICS & GYNECOLOGY
Payer: COMMERCIAL

## 2019-03-15 ENCOUNTER — ANESTHESIA EVENT (OUTPATIENT)
Dept: SURGERY | Facility: MEDICAL CENTER | Age: 39
DRG: 742 | End: 2019-03-15
Payer: COMMERCIAL

## 2019-03-15 DIAGNOSIS — N93.8 DYSFUNCTIONAL UTERINE BLEEDING: ICD-10-CM

## 2019-03-15 DIAGNOSIS — G89.18 POSTOPERATIVE PAIN: ICD-10-CM

## 2019-03-15 DIAGNOSIS — D64.9 ANEMIA, UNSPECIFIED TYPE: ICD-10-CM

## 2019-03-15 LAB
ABO GROUP BLD: NORMAL
ANION GAP SERPL CALC-SCNC: 5 MMOL/L (ref 0–11.9)
ANISOCYTOSIS BLD QL SMEAR: ABNORMAL
APTT PPP: 28.5 SEC (ref 24.7–36)
BARCODED ABORH UBTYP: 5100
BARCODED PRD CODE UBPRD: NORMAL
BARCODED UNIT NUM UBUNT: NORMAL
BASOPHILS # BLD AUTO: 0.9 % (ref 0–1.8)
BASOPHILS # BLD: 0.05 K/UL (ref 0–0.12)
BLD GP AB SCN SERPL QL: NORMAL
BUN SERPL-MCNC: 8 MG/DL (ref 8–22)
CALCIUM SERPL-MCNC: 8.6 MG/DL (ref 8.5–10.5)
CHLORIDE SERPL-SCNC: 109 MMOL/L (ref 96–112)
CO2 SERPL-SCNC: 22 MMOL/L (ref 20–33)
COMPONENT R 8504R: NORMAL
CREAT SERPL-MCNC: 0.75 MG/DL (ref 0.5–1.4)
EOSINOPHIL # BLD AUTO: 0.05 K/UL (ref 0–0.51)
EOSINOPHIL NFR BLD: 0.9 % (ref 0–6.9)
ERYTHROCYTE [DISTWIDTH] IN BLOOD BY AUTOMATED COUNT: 64.1 FL (ref 35.9–50)
FIBRINOGEN PPP-MCNC: 436 MG/DL (ref 215–460)
GLUCOSE SERPL-MCNC: 97 MG/DL (ref 65–99)
HCG SERPL QL: NEGATIVE
HCT VFR BLD AUTO: 22.8 % (ref 37–47)
HCT VFR BLD AUTO: 23.7 % (ref 37–47)
HGB BLD-MCNC: 6.6 G/DL (ref 12–16)
HGB BLD-MCNC: 6.7 G/DL (ref 12–16)
INR PPP: 1.26 (ref 0.87–1.13)
LG PLATELETS BLD QL SMEAR: NORMAL
LYMPHOCYTES # BLD AUTO: 0.51 K/UL (ref 1–4.8)
LYMPHOCYTES NFR BLD: 9.5 % (ref 22–41)
MACROCYTES BLD QL SMEAR: ABNORMAL
MANUAL DIFF BLD: NORMAL
MCH RBC QN AUTO: 22.6 PG (ref 27–33)
MCHC RBC AUTO-ENTMCNC: 28.3 G/DL (ref 33.6–35)
MCV RBC AUTO: 79.8 FL (ref 81.4–97.8)
MICROCYTES BLD QL SMEAR: ABNORMAL
MONOCYTES # BLD AUTO: 0.05 K/UL (ref 0–0.85)
MONOCYTES NFR BLD AUTO: 0.9 % (ref 0–13.4)
MORPHOLOGY BLD-IMP: NORMAL
NEUTROPHILS # BLD AUTO: 4.74 K/UL (ref 2–7.15)
NEUTROPHILS NFR BLD: 87.8 % (ref 44–72)
NRBC # BLD AUTO: 0 K/UL
NRBC BLD-RTO: 0 /100 WBC
OVALOCYTES BLD QL SMEAR: NORMAL
PLATELET # BLD AUTO: 169 K/UL (ref 164–446)
PLATELET BLD QL SMEAR: NORMAL
POIKILOCYTOSIS BLD QL SMEAR: NORMAL
POTASSIUM SERPL-SCNC: 3.4 MMOL/L (ref 3.6–5.5)
PRODUCT TYPE UPROD: NORMAL
PROTHROMBIN TIME: 15.9 SEC (ref 12–14.6)
RBC # BLD AUTO: 2.97 M/UL (ref 4.2–5.4)
RBC BLD AUTO: PRESENT
RH BLD: NORMAL
SODIUM SERPL-SCNC: 136 MMOL/L (ref 135–145)
UNIT STATUS USTAT: NORMAL
WBC # BLD AUTO: 5.4 K/UL (ref 4.8–10.8)

## 2019-03-15 PROCEDURE — 700111 HCHG RX REV CODE 636 W/ 250 OVERRIDE (IP): Performed by: OBSTETRICS & GYNECOLOGY

## 2019-03-15 PROCEDURE — 86900 BLOOD TYPING SEROLOGIC ABO: CPT

## 2019-03-15 PROCEDURE — 36415 COLL VENOUS BLD VENIPUNCTURE: CPT

## 2019-03-15 PROCEDURE — 85384 FIBRINOGEN ACTIVITY: CPT

## 2019-03-15 PROCEDURE — 700102 HCHG RX REV CODE 250 W/ 637 OVERRIDE(OP): Performed by: OBSTETRICS & GYNECOLOGY

## 2019-03-15 PROCEDURE — 700102 HCHG RX REV CODE 250 W/ 637 OVERRIDE(OP): Performed by: EMERGENCY MEDICINE

## 2019-03-15 PROCEDURE — 85018 HEMOGLOBIN: CPT

## 2019-03-15 PROCEDURE — 86923 COMPATIBILITY TEST ELECTRIC: CPT | Mod: 91

## 2019-03-15 PROCEDURE — A9270 NON-COVERED ITEM OR SERVICE: HCPCS | Performed by: EMERGENCY MEDICINE

## 2019-03-15 PROCEDURE — 85014 HEMATOCRIT: CPT

## 2019-03-15 PROCEDURE — 700111 HCHG RX REV CODE 636 W/ 250 OVERRIDE (IP)

## 2019-03-15 PROCEDURE — 80048 BASIC METABOLIC PNL TOTAL CA: CPT

## 2019-03-15 PROCEDURE — 84703 CHORIONIC GONADOTROPIN ASSAY: CPT

## 2019-03-15 PROCEDURE — 86850 RBC ANTIBODY SCREEN: CPT

## 2019-03-15 PROCEDURE — 85007 BL SMEAR W/DIFF WBC COUNT: CPT

## 2019-03-15 PROCEDURE — 36430 TRANSFUSION BLD/BLD COMPNT: CPT

## 2019-03-15 PROCEDURE — 85730 THROMBOPLASTIN TIME PARTIAL: CPT

## 2019-03-15 PROCEDURE — 85027 COMPLETE CBC AUTOMATED: CPT

## 2019-03-15 PROCEDURE — P9016 RBC LEUKOCYTES REDUCED: HCPCS | Mod: 91

## 2019-03-15 PROCEDURE — 30233N1 TRANSFUSION OF NONAUTOLOGOUS RED BLOOD CELLS INTO PERIPHERAL VEIN, PERCUTANEOUS APPROACH: ICD-10-PCS | Performed by: OBSTETRICS & GYNECOLOGY

## 2019-03-15 PROCEDURE — 770006 HCHG ROOM/CARE - MED/SURG/GYN SEMI*

## 2019-03-15 PROCEDURE — 85610 PROTHROMBIN TIME: CPT

## 2019-03-15 PROCEDURE — A9270 NON-COVERED ITEM OR SERVICE: HCPCS | Performed by: OBSTETRICS & GYNECOLOGY

## 2019-03-15 PROCEDURE — 86901 BLOOD TYPING SEROLOGIC RH(D): CPT

## 2019-03-15 PROCEDURE — 700101 HCHG RX REV CODE 250

## 2019-03-15 PROCEDURE — 99291 CRITICAL CARE FIRST HOUR: CPT

## 2019-03-15 RX ORDER — IRON, FOLIC ACID, CYANOCOBALAMIN, ASCORBIC ACID, AND DOCUSATE SODIUM 90; 1; 12; 120; 50 MG/1; MG/1; UG/1; MG/1; MG/1
1 TABLET, FILM COATED ORAL DAILY
COMMUNITY

## 2019-03-15 RX ORDER — SODIUM CHLORIDE 9 MG/ML
INJECTION, SOLUTION INTRAVENOUS
Status: ACTIVE
Start: 2019-03-15 | End: 2019-03-16

## 2019-03-15 RX ORDER — ONDANSETRON 2 MG/ML
4 INJECTION INTRAMUSCULAR; INTRAVENOUS EVERY 4 HOURS PRN
Status: DISCONTINUED | OUTPATIENT
Start: 2019-03-15 | End: 2019-03-21 | Stop reason: HOSPADM

## 2019-03-15 RX ORDER — ACETAMINOPHEN 500 MG
500 TABLET ORAL EVERY 6 HOURS PRN
Status: DISCONTINUED | OUTPATIENT
Start: 2019-03-15 | End: 2019-03-19

## 2019-03-15 RX ORDER — ACETAMINOPHEN 325 MG/1
325 TABLET ORAL ONCE
Status: COMPLETED | OUTPATIENT
Start: 2019-03-15 | End: 2019-03-15

## 2019-03-15 RX ORDER — SODIUM CHLORIDE 9 MG/ML
INJECTION, SOLUTION INTRAVENOUS CONTINUOUS
Status: DISPENSED | OUTPATIENT
Start: 2019-03-15 | End: 2019-03-16

## 2019-03-15 RX ORDER — DOCUSATE SODIUM 100 MG/1
100 CAPSULE, LIQUID FILLED ORAL 2 TIMES DAILY
Status: DISCONTINUED | OUTPATIENT
Start: 2019-03-15 | End: 2019-03-19

## 2019-03-15 RX ADMIN — ACETAMINOPHEN 500 MG: 500 TABLET ORAL at 20:49

## 2019-03-15 RX ADMIN — CONJUGATED ESTROGENS 25 MG: 25 INJECTION, POWDER, LYOPHILIZED, FOR SOLUTION INTRAMUSCULAR; INTRAVENOUS at 22:39

## 2019-03-15 RX ADMIN — ACETAMINOPHEN 325 MG: 325 TABLET, FILM COATED ORAL at 14:29

## 2019-03-15 ASSESSMENT — COGNITIVE AND FUNCTIONAL STATUS - GENERAL
DAILY ACTIVITIY SCORE: 24
SUGGESTED CMS G CODE MODIFIER DAILY ACTIVITY: CH
MOBILITY SCORE: 24
SUGGESTED CMS G CODE MODIFIER MOBILITY: CH

## 2019-03-15 ASSESSMENT — LIFESTYLE VARIABLES: DO YOU DRINK ALCOHOL: NO

## 2019-03-15 ASSESSMENT — PATIENT HEALTH QUESTIONNAIRE - PHQ9
2. FEELING DOWN, DEPRESSED, IRRITABLE, OR HOPELESS: NOT AT ALL
SUM OF ALL RESPONSES TO PHQ9 QUESTIONS 1 AND 2: 0
1. LITTLE INTEREST OR PLEASURE IN DOING THINGS: NOT AT ALL

## 2019-03-15 NOTE — H&P
"Gynecology History and Physical    CC: vaginal bleeding    HPI: Stacey Francisco is a 38 yo  who presented to the ED with complaints of vaginal bleeding. She has a history of heavy menses. 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 She was seen in the ED twice and received two blood transfusions for anemia at each visit. She was started on Provera at her second visit. She followed up with Dr Donato in office and had a discussion of management options. She ultimately desired hysterectomy which was in the process of being scheduled. She was started on a LoEstrin taper. She comes in today with heavy bleeding. She is changing a maternity pad every hour and passing large clots. She is experiencing fatigue but denies CP, dizziness or SOB.     ROS: All other systems were reviewed and found to be negative.     PMH: none  PSH:  x5, tubal, gastric sleeve, cholecystectomy and appendectomy  OB Hx: 5  deliveries  Gyn HX: denies STI, remote abnormal pap with a colposcopy and return to normal  SH: denies T/E/D  FH: DM, HTN, MI    /46   Pulse 87   Temp 36.4 °C (97.5 °F) (Temporal)   Resp 16   Ht 1.676 m (5' 6\")   Wt 84.3 kg (185 lb 13.6 oz)   LMP 2019 (Exact Date)   SpO2 100%   BMI 30.00 kg/m²     Gen: NAD  GI: soft, NT, ND  : normal appearing external genitalia, vaginal mucosa free of lesions, approximately 50cc of dark blood and clot in her posterior fornix, cervix normal in appearance  Ext: no edema    Recent Labs      19   1700  03/15/19   1048   WBC  8.4  5.4   RBC  3.63*  2.97*   HEMOGLOBIN  8.2*  6.7*   HEMATOCRIT  28.7*  23.7*   MCV  79.1*  79.8*   MCH  22.6*  22.6*   RDW  60.0*  64.1*   PLATELETCT  211  169   NEUTSPOLYS  75.40*  87.80*   LYMPHOCYTES  16.90*  9.50*   MONOCYTES  6.50  0.90   EOSINOPHILS  0.60  0.90   BASOPHILS  0.20  0.90   RBCMORPHOLO  Present  Present         TVUS:  The uterus measures 5.60 cm x 9.62 cm x 5.31 cm.  The uterine myometrium is " heterogeneous. There is an ill-defined right mid uterine body mass measuring 2.3 x 2.0 x 2.6 cm with some vascularity most consistent with a uterine fibroid. This questionably abuts the endometrial complex.The endometrial echo complex measures 1.06 cm.Low resistive waveforms are confirmed within the ovaries.The right ovary measures 1.42 cm x 2.21 cm x 1.61 cm.The left ovary measures 2.94 cm x 1.91 cm x 3.54 cm.There is no adnexal mass.There is no free fluid seen.    EMB: Pending    Assessment:   1. AUB-L  2. Symptomatic anemia  3. Failed medical management    Plan: Persistent heavy vaginal bleeding requiring multiple transfusions. Failed medical management outpatient. Currently receiving a transfusion. She is stable. Will admit and start on IV premarin. If no improvement, will plan for in house hysterectomy but ideally we would like to increase her hemoglobin to normal prior to surgery.     Nicole Grace M.D.

## 2019-03-15 NOTE — ED TRIAGE NOTES
Pt comes in complaining of vaginal bleeding. Pt seen here a couple times this week for the same and required a blood transfusion.

## 2019-03-15 NOTE — ED NOTES
Patient aware of orders for pre-op.  Pt in gown, with all jewelry removed.    Report given to pre-op RN  Patient calling family   Pt has all personal belongings

## 2019-03-15 NOTE — ED PROVIDER NOTES
"ED Provider Note    CHIEF COMPLAINT  Chief Complaint   Patient presents with   • Vaginal Bleeding       HPI  Stacey Francisco is a 39 y.o. female who presents to the emergency department with vaginal bleeding.  Symptoms started about 2 weeks ago.  She has heavy bleeding.  She was first evaluated in the emergency department on 3/7.  Identified to have critical anemia.  Did not want to initiate hormone therapy.  Was given a transfusion of packed red blood cells.  Plan was to follow-up with her personal gynecologist.  She ended up returning to the emergency department on 3/10.  She had recurrent critical anemia.  She was given an additional 2 units of blood.  At that time she initiated hormone therapy.  OB/GYN was consulted at that time.  She had persistent bleeding despite initiation of hormones.  She was seen again on 3/12.  Hemoglobin at that time was 8.  She was initiated on tranexamic acid.  She followed up with OB/GYN.  Patient was scheduled for a hysterectomy in 3 weeks.  She has had continued ongoing heavy bleeding and clots.  She is feeling progressively more and more fatigued.  She returns today for recheck.  No fevers, chills, dyspnea, syncope.    REVIEW OF SYSTEMS  As per HPI, otherwise a 10 point review of systems is negative    PAST MEDICAL HISTORY  Past Medical History:   Diagnosis Date   • Anemia        SOCIAL HISTORY  Social History   Substance Use Topics   • Smoking status: Not on file   • Smokeless tobacco: Not on file   • Alcohol use Not on file       SURGICAL HISTORY  No past surgical history on file.    CURRENT MEDICATIONS  Home Medications    **Home medications have not yet been reviewed for this encounter**         ALLERGIES  No Known Allergies    PHYSICAL EXAM  VITAL SIGNS: /74   Pulse 90   Temp 36.6 °C (97.9 °F) (Temporal)   Resp 16   Ht 1.676 m (5' 6\")   Wt 84.3 kg (185 lb 13.6 oz)   LMP 03/01/2019 (Exact Date)   SpO2 99%   BMI 30.00 kg/m²    Constitutional: Awake and " alert.  Mildly pale appearing  HENT: Normal inspection  Eyes: Normal inspection  Neck: Supple  Cardiovascular: Normal heart rate  Thorax & Lungs: No respiratory distress   Skin: Mild pallor  Extremities: Well perfused  Neurologic: Grossly normal   Psychiatric: Normal affect        Labs:  Results for orders placed or performed during the hospital encounter of 03/15/19   CBC WITH DIFFERENTIAL   Result Value Ref Range    WBC 5.4 4.8 - 10.8 K/uL    RBC 2.97 (L) 4.20 - 5.40 M/uL    Hemoglobin 6.7 (L) 12.0 - 16.0 g/dL    Hematocrit 23.7 (L) 37.0 - 47.0 %    MCV 79.8 (L) 81.4 - 97.8 fL    MCH 22.6 (L) 27.0 - 33.0 pg    MCHC 28.3 (L) 33.6 - 35.0 g/dL    RDW 64.1 (H) 35.9 - 50.0 fL    Platelet Count 169 164 - 446 K/uL    Neutrophils-Polys 87.80 (H) 44.00 - 72.00 %    Lymphocytes 9.50 (L) 22.00 - 41.00 %    Monocytes 0.90 0.00 - 13.40 %    Eosinophils 0.90 0.00 - 6.90 %    Basophils 0.90 0.00 - 1.80 %    Nucleated RBC 0.00 /100 WBC    Neutrophils (Absolute) 4.74 2.00 - 7.15 K/uL    Lymphs (Absolute) 0.51 (L) 1.00 - 4.80 K/uL    Monos (Absolute) 0.05 0.00 - 0.85 K/uL    Eos (Absolute) 0.05 0.00 - 0.51 K/uL    Baso (Absolute) 0.05 0.00 - 0.12 K/uL    NRBC (Absolute) 0.00 K/uL    Anisocytosis 2+     Macrocytosis 1+     Microcytosis 1+    APTT   Result Value Ref Range    APTT 28.5 24.7 - 36.0 sec   PROTHROMBIN TIME (INR)   Result Value Ref Range    PT 15.9 (H) 12.0 - 14.6 sec    INR 1.26 (H) 0.87 - 1.13   BASIC METABOLIC PANEL   Result Value Ref Range    Sodium 136 135 - 145 mmol/L    Potassium 3.4 (L) 3.6 - 5.5 mmol/L    Chloride 109 96 - 112 mmol/L    Co2 22 20 - 33 mmol/L    Glucose 97 65 - 99 mg/dL    Bun 8 8 - 22 mg/dL    Creatinine 0.75 0.50 - 1.40 mg/dL    Calcium 8.6 8.5 - 10.5 mg/dL    Anion Gap 5.0 0.0 - 11.9   ESTIMATED GFR   Result Value Ref Range    GFR If African American >60 >60 mL/min/1.73 m 2    GFR If Non African American >60 >60 mL/min/1.73 m 2   DIFFERENTIAL MANUAL   Result Value Ref Range    Manual Diff  Status PERFORMED    PERIPHERAL SMEAR REVIEW   Result Value Ref Range    Peripheral Smear Review see below    PLATELET ESTIMATE   Result Value Ref Range    Plt Estimation Normal    MORPHOLOGY   Result Value Ref Range    RBC Morphology Present     Large Platelets 1+     Poikilocytosis 2+     Ovalocytes 2+          COURSE & MEDICAL DECISION MAKING  Patient presents with ongoing dysfunctional uterine bleeding despite oral estrogens and tranexamic acid.  I obtained repeat laboratory data and again she has a hemoglobin less than 7.  I ordered transfusion of 1 unit of packed red blood cells after consent.    I have explained to the patient the risks and benefits of transfusion of blood products.  This includes, as appropriate, the risk of mild allergic reaction, hemolytic reaction, transfusion-associated lung injury, febrile reactions, circulatory or iron overload, and infection.    We discussed possible alternatives and their risks, including directed donation, autologous transfusion, and no transfusion, including IV or oral iron supplementation, as appropriate.  I believe the patient understands the risks and benefits and was able to express understanding.    I paged Dr. Donato.  I discussed case with Dr. Grace.  Dr. Grace stated that she would evaluate the patient in the ER.  I would expect the patient to be admitted to the hospital for surgical management as she has clearly failed outpatient measures and is required multiple visits to the ER with repeated transfusions.      FINAL IMPRESSION  1.  Critical anemia  2.  Dysfunctional uterine bleeding      This dictation was created using voice recognition software. The accuracy of the dictation is limited to the abilities of the software.  The nursing notes were reviewed and certain aspects of this information were incorporated into this note.      Electronically signed by: Willie Thakur, 3/15/2019 11:51 AM

## 2019-03-15 NOTE — DISCHARGE PLANNING
Pt is independent prior to admission. 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      Care Transition Team Assessment    Information Source  Orientation : Oriented x 4  Information Given By: Patient  Who is responsible for making decisions for patient? : Patient    Readmission Evaluation  Is this a readmission?: No    Elopement Risk  Legal Hold: No    Interdisciplinary Discharge Planning  Does Admitting Nurse Feel This Could be a Complex Discharge?: No  Primary Care Physician: has one in [place]  Lives with - Patient's Self Care Capacity: Alone and Able to Care For Self  Support Systems: Family Member(s), Friends / Neighbors  Able to Return to Previous ADL's: Yes  Mobility Issues: No  Prior Services: Home-Independent  Patient Expects to be Discharged to:: home  Assistance Needed: No  Durable Medical Equipment: Not Applicable    Discharge Preparedness  What is your plan after discharge?: Home with help  What are your discharge supports?: Other (comment)  Prior Functional Level: Independent with Activities of Daily Living  Difficulity with ADLs: None  Difficulity with IADLs: None    Functional Assesment  Prior Functional Level: Independent with Activities of Daily Living    Finances  Financial Barriers to Discharge: No  Prescription Coverage: Yes    Domestic Abuse  Have you ever been the victim of abuse or violence?: No     Discharge Risks or Barriers  Discharge risks or barriers?: No    Anticipated Discharge Information  Anticipated discharge disposition: Home

## 2019-03-16 LAB
ALBUMIN SERPL BCP-MCNC: 3.4 G/DL (ref 3.2–4.9)
ALBUMIN/GLOB SERPL: 1.3 G/DL
ALP SERPL-CCNC: 43 U/L (ref 30–99)
ALT SERPL-CCNC: 9 U/L (ref 2–50)
ANION GAP SERPL CALC-SCNC: 6 MMOL/L (ref 0–11.9)
ANISOCYTOSIS BLD QL SMEAR: ABNORMAL
ANISOCYTOSIS BLD QL SMEAR: NORMAL
AST SERPL-CCNC: 13 U/L (ref 12–45)
BASOPHILS # BLD AUTO: 0.3 % (ref 0–1.8)
BASOPHILS # BLD: 0.02 K/UL (ref 0–0.12)
BILIRUB SERPL-MCNC: 1.2 MG/DL (ref 0.1–1.5)
BUN SERPL-MCNC: 9 MG/DL (ref 8–22)
CALCIUM SERPL-MCNC: 7.4 MG/DL (ref 8.5–10.5)
CHLORIDE SERPL-SCNC: 110 MMOL/L (ref 96–112)
CO2 SERPL-SCNC: 23 MMOL/L (ref 20–33)
COMMENT 1642: NORMAL
CREAT SERPL-MCNC: 0.7 MG/DL (ref 0.5–1.4)
EOSINOPHIL # BLD AUTO: 0.13 K/UL (ref 0–0.51)
EOSINOPHIL NFR BLD: 1.9 % (ref 0–6.9)
ERYTHROCYTE [DISTWIDTH] IN BLOOD BY AUTOMATED COUNT: 59.1 FL (ref 35.9–50)
ERYTHROCYTE [DISTWIDTH] IN BLOOD BY AUTOMATED COUNT: 59.8 FL (ref 35.9–50)
GIANT PLATELETS BLD QL SMEAR: NORMAL
GLOBULIN SER CALC-MCNC: 2.7 G/DL (ref 1.9–3.5)
GLUCOSE SERPL-MCNC: 96 MG/DL (ref 65–99)
HCT VFR BLD AUTO: 26 % (ref 37–47)
HCT VFR BLD AUTO: 27.1 % (ref 37–47)
HGB BLD-MCNC: 7.9 G/DL (ref 12–16)
HGB BLD-MCNC: 8.3 G/DL (ref 12–16)
IMM GRANULOCYTES # BLD AUTO: 0.02 K/UL (ref 0–0.11)
IMM GRANULOCYTES NFR BLD AUTO: 0.3 % (ref 0–0.9)
LG PLATELETS BLD QL SMEAR: NORMAL
LYMPHOCYTES # BLD AUTO: 0.9 K/UL (ref 1–4.8)
LYMPHOCYTES NFR BLD: 12.9 % (ref 22–41)
MACROCYTES BLD QL SMEAR: ABNORMAL
MCH RBC QN AUTO: 25 PG (ref 27–33)
MCH RBC QN AUTO: 25.1 PG (ref 27–33)
MCHC RBC AUTO-ENTMCNC: 30.4 G/DL (ref 33.6–35)
MCHC RBC AUTO-ENTMCNC: 30.6 G/DL (ref 33.6–35)
MCV RBC AUTO: 81.9 FL (ref 81.4–97.8)
MCV RBC AUTO: 82.3 FL (ref 81.4–97.8)
MICROCYTES BLD QL SMEAR: ABNORMAL
MICROCYTES BLD QL SMEAR: NORMAL
MONOCYTES # BLD AUTO: 0.39 K/UL (ref 0–0.85)
MONOCYTES NFR BLD AUTO: 5.6 % (ref 0–13.4)
MORPHOLOGY BLD-IMP: NORMAL
MORPHOLOGY BLD-IMP: NORMAL
NEUTROPHILS # BLD AUTO: 5.5 K/UL (ref 2–7.15)
NEUTROPHILS NFR BLD: 79 % (ref 44–72)
NRBC # BLD AUTO: 0 K/UL
NRBC BLD-RTO: 0 /100 WBC
OVALOCYTES BLD QL SMEAR: NORMAL
OVALOCYTES BLD QL SMEAR: NORMAL
PLATELET # BLD AUTO: 153 K/UL (ref 164–446)
PLATELET # BLD AUTO: 171 K/UL (ref 164–446)
PLATELET BLD QL SMEAR: NORMAL
PLATELET BLD QL SMEAR: NORMAL
POIKILOCYTOSIS BLD QL SMEAR: NORMAL
POIKILOCYTOSIS BLD QL SMEAR: NORMAL
POTASSIUM SERPL-SCNC: 3.7 MMOL/L (ref 3.6–5.5)
PROT SERPL-MCNC: 6.1 G/DL (ref 6–8.2)
RBC # BLD AUTO: 3.16 M/UL (ref 4.2–5.4)
RBC # BLD AUTO: 3.31 M/UL (ref 4.2–5.4)
RBC BLD AUTO: PRESENT
RBC BLD AUTO: PRESENT
SODIUM SERPL-SCNC: 139 MMOL/L (ref 135–145)
WBC # BLD AUTO: 5.4 K/UL (ref 4.8–10.8)
WBC # BLD AUTO: 7 K/UL (ref 4.8–10.8)

## 2019-03-16 PROCEDURE — 86923 COMPATIBILITY TEST ELECTRIC: CPT

## 2019-03-16 PROCEDURE — A9270 NON-COVERED ITEM OR SERVICE: HCPCS | Performed by: OBSTETRICS & GYNECOLOGY

## 2019-03-16 PROCEDURE — 700102 HCHG RX REV CODE 250 W/ 637 OVERRIDE(OP): Performed by: OBSTETRICS & GYNECOLOGY

## 2019-03-16 PROCEDURE — 36415 COLL VENOUS BLD VENIPUNCTURE: CPT

## 2019-03-16 PROCEDURE — 36430 TRANSFUSION BLD/BLD COMPNT: CPT

## 2019-03-16 PROCEDURE — P9016 RBC LEUKOCYTES REDUCED: HCPCS

## 2019-03-16 PROCEDURE — 85025 COMPLETE CBC W/AUTO DIFF WBC: CPT

## 2019-03-16 PROCEDURE — 85027 COMPLETE CBC AUTOMATED: CPT

## 2019-03-16 PROCEDURE — 770006 HCHG ROOM/CARE - MED/SURG/GYN SEMI*

## 2019-03-16 PROCEDURE — 700111 HCHG RX REV CODE 636 W/ 250 OVERRIDE (IP): Performed by: OBSTETRICS & GYNECOLOGY

## 2019-03-16 PROCEDURE — 80053 COMPREHEN METABOLIC PANEL: CPT

## 2019-03-16 PROCEDURE — 700105 HCHG RX REV CODE 258: Performed by: OBSTETRICS & GYNECOLOGY

## 2019-03-16 RX ORDER — SODIUM CHLORIDE 9 MG/ML
INJECTION, SOLUTION INTRAVENOUS
Status: ACTIVE
Start: 2019-03-16 | End: 2019-03-16

## 2019-03-16 RX ORDER — TRAZODONE HYDROCHLORIDE 50 MG/1
50 TABLET ORAL
Status: DISCONTINUED | OUTPATIENT
Start: 2019-03-16 | End: 2019-03-21

## 2019-03-16 RX ORDER — FERROUS GLUCONATE 324(38)MG
324 TABLET ORAL TWICE DAILY
Status: DISCONTINUED | OUTPATIENT
Start: 2019-03-16 | End: 2019-03-21 | Stop reason: HOSPADM

## 2019-03-16 RX ADMIN — CONJUGATED ESTROGENS 25 MG: 25 INJECTION, POWDER, LYOPHILIZED, FOR SOLUTION INTRAMUSCULAR; INTRAVENOUS at 02:43

## 2019-03-16 RX ADMIN — NORGESTREL AND ETHINYL ESTRADIOL 1 TABLET: KIT at 18:17

## 2019-03-16 RX ADMIN — ACETAMINOPHEN 500 MG: 500 TABLET ORAL at 18:18

## 2019-03-16 RX ADMIN — NORGESTREL AND ETHINYL ESTRADIOL 1 TABLET: KIT at 05:02

## 2019-03-16 RX ADMIN — SODIUM CHLORIDE: 9 INJECTION, SOLUTION INTRAVENOUS at 00:41

## 2019-03-16 RX ADMIN — FERROUS GLUCONATE 324 MG: 324 TABLET ORAL at 20:30

## 2019-03-16 RX ADMIN — ONDANSETRON 4 MG: 2 INJECTION INTRAMUSCULAR; INTRAVENOUS at 08:04

## 2019-03-16 RX ADMIN — ACETAMINOPHEN 500 MG: 500 TABLET ORAL at 05:33

## 2019-03-16 RX ADMIN — TRAZODONE HYDROCHLORIDE 50 MG: 50 TABLET ORAL at 22:32

## 2019-03-16 NOTE — ANESTHESIA PREPROCEDURE EVALUATION
39yoF with multiple ED visits for vaginal bleeding in several days-- transfused multiple times for Hgb <7..  Presenting today for same issue of dizziness and continued vaginal bleeding-- had hgb 6.7.  Received 1 unit pRBCs in ED.       Relevant Problems   No relevant active problems   hx STEW in past- resolved with weight loss  Appropriately NPO    Physical Exam    Airway   Mallampati: II  TM distance: >3 FB  Neck ROM: full       Cardiovascular   Rhythm: regular  Rate: abnormal     Dental - normal exam         Pulmonary   Breath sounds clear to auscultation     Abdominal    Neurological - normal exam                 Anesthesia Plan    ASA 2 - emergent   ASA physical status emergent criteria: acute hemorrhage    Plan - general       Airway plan will be ETT        Induction: intravenous    Postoperative Plan: Postoperative administration of opioids is intended.    Pertinent diagnostic labs and testing reviewed    Informed Consent:    Anesthetic plan and risks discussed with patient.    Use of blood products discussed with: patient whom.

## 2019-03-16 NOTE — PROGRESS NOTES
"Pad count from 2030 3/15 to 0720 3/16= 9 pads. Pt reports bleeding slowed down for a little bit after 1st dose IV estrogen. Began \"gushing\" again this AM per pt.   "

## 2019-03-16 NOTE — PROGRESS NOTES
Case cancelled by Dr. Donato. After evaluation and examination, decision by made by Dr. Donato to cancel the case until a later date

## 2019-03-16 NOTE — PROGRESS NOTES
Received report from Briana in Pre-op.  Pt A&Ox4.  Having large amounts of vaginal bleeding w/clots per pt report.  Denies N/V, slight headache, medicated w/acetaminophen per MAR.  Call light within reach, plan of care discussed w/pt. No further needs at this time.

## 2019-03-16 NOTE — PROGRESS NOTES
2 RN skin check complete.  No skin issues noted at this time. All skin intact over bony prominences.

## 2019-03-16 NOTE — CARE PLAN
Problem: Communication  Goal: The ability to communicate needs accurately and effectively will improve  Outcome: PROGRESSING AS EXPECTED  Pt communicates needs appropriately at this time.     Problem: Safety  Goal: Will remain free from falls  Outcome: PROGRESSING AS EXPECTED  Fall precautions in place, pt calls appropriately for assistance.

## 2019-03-16 NOTE — PROGRESS NOTES
Progress Note    Name:   Stacey Francisco   Date/Time:  3/15/2019 6:32 PM  Admit Date:   3/15/2019  Admitting Dx:   Menorrhagia    Subjective:  Pt was in preop awaiting the or. She continued to have vaginal bleeding since earlier this am. She is npo. She got 1 u prbc this am....none since. Repeat h/h is back now.     Objective:   Vitals:    03/15/19 1357 03/15/19 1400 03/15/19 1445 03/15/19 1529   BP:    116/46   Pulse: 79 80 70 87   Resp:    16   Temp:  36.6 °C (97.9 °F)  36.4 °C (97.5 °F)   TempSrc:  Temporal  Temporal   SpO2: 100% 98% 99% 100%   Weight:       Height:         Gen: pt appears in no acute distress/not tachycardic, sitting up on gurney talking w/ friend  Abd: soft nt/nd  Ext: no calf pain cali    Bleeding: She has soaked 2 pads recently. When pushing on abdomen: there is no gushing of bleding seen.     Labs:  Recent Results (from the past 72 hour(s))   CBC WITH DIFFERENTIAL    Collection Time: 03/15/19 10:48 AM   Result Value Ref Range    WBC 5.4 4.8 - 10.8 K/uL    RBC 2.97 (L) 4.20 - 5.40 M/uL    Hemoglobin 6.7 (L) 12.0 - 16.0 g/dL    Hematocrit 23.7 (L) 37.0 - 47.0 %    MCV 79.8 (L) 81.4 - 97.8 fL    MCH 22.6 (L) 27.0 - 33.0 pg    MCHC 28.3 (L) 33.6 - 35.0 g/dL    RDW 64.1 (H) 35.9 - 50.0 fL    Platelet Count 169 164 - 446 K/uL    Neutrophils-Polys 87.80 (H) 44.00 - 72.00 %    Lymphocytes 9.50 (L) 22.00 - 41.00 %    Monocytes 0.90 0.00 - 13.40 %    Eosinophils 0.90 0.00 - 6.90 %    Basophils 0.90 0.00 - 1.80 %    Nucleated RBC 0.00 /100 WBC    Neutrophils (Absolute) 4.74 2.00 - 7.15 K/uL    Lymphs (Absolute) 0.51 (L) 1.00 - 4.80 K/uL    Monos (Absolute) 0.05 0.00 - 0.85 K/uL    Eos (Absolute) 0.05 0.00 - 0.51 K/uL    Baso (Absolute) 0.05 0.00 - 0.12 K/uL    NRBC (Absolute) 0.00 K/uL    Anisocytosis 2+     Macrocytosis 1+     Microcytosis 1+    APTT    Collection Time: 03/15/19 10:48 AM   Result Value Ref Range    APTT 28.5 24.7 - 36.0 sec   PROTHROMBIN TIME (INR)    Collection Time: 03/15/19  10:48 AM   Result Value Ref Range    PT 15.9 (H) 12.0 - 14.6 sec    INR 1.26 (H) 0.87 - 1.13   BASIC METABOLIC PANEL    Collection Time: 03/15/19 10:48 AM   Result Value Ref Range    Sodium 136 135 - 145 mmol/L    Potassium 3.4 (L) 3.6 - 5.5 mmol/L    Chloride 109 96 - 112 mmol/L    Co2 22 20 - 33 mmol/L    Glucose 97 65 - 99 mg/dL    Bun 8 8 - 22 mg/dL    Creatinine 0.75 0.50 - 1.40 mg/dL    Calcium 8.6 8.5 - 10.5 mg/dL    Anion Gap 5.0 0.0 - 11.9   COD (ADULT)    Collection Time: 03/15/19 10:48 AM   Result Value Ref Range    ABO Grouping Only O     Rh Grouping Only POS     Antibody Screen-Cod NEG     Component R       R4                  Red Blood Cells     [id number]   issued       03/15/19   13:11      Product Type Red Blood Cells LR Pheresis     Dispense Status Issued     Unit Number (Barcoded) [id number]     Product Code (Barcoded) J9856A89     Blood Type (Barcoded) 5100    ESTIMATED GFR    Collection Time: 03/15/19 10:48 AM   Result Value Ref Range    GFR If African American >60 >60 mL/min/1.73 m 2    GFR If Non African American >60 >60 mL/min/1.73 m 2   DIFFERENTIAL MANUAL    Collection Time: 03/15/19 10:48 AM   Result Value Ref Range    Manual Diff Status PERFORMED    PERIPHERAL SMEAR REVIEW    Collection Time: 03/15/19 10:48 AM   Result Value Ref Range    Peripheral Smear Review see below    PLATELET ESTIMATE    Collection Time: 03/15/19 10:48 AM   Result Value Ref Range    Plt Estimation Normal    MORPHOLOGY    Collection Time: 03/15/19 10:48 AM   Result Value Ref Range    RBC Morphology Present     Large Platelets 1+     Poikilocytosis 2+     Ovalocytes 2+    FIBRINOGEN    Collection Time: 03/15/19 10:48 AM   Result Value Ref Range    Fibrinogen 436 215 - 460 mg/dL   HCG QUAL SERUM    Collection Time: 03/15/19 10:48 AM   Result Value Ref Range    Beta-Hcg Qualitative Serum Negative Negative   HEMOGLOBIN AND HEMATOCRIT    Collection Time: 03/15/19  5:31 PM   Result Value Ref Range    Hemoglobin 6.6  (L) 12.0 - 16.0 g/dL    Hematocrit 22.8 (L) 37.0 - 47.0 %         Assessment: Plan  Symptomatic menorrhagia  Acute blood loss anemia  Prev c/s x 5 with BTL - pt wants definitve treatment and a hysterectomy.  But will try to stop her bleeding first with IV premarin and then subsequently give her 2 U additional units. Replace electrolytes as needed.  She will still need surgery but she is stable and would like to get her better hemodynamically as this may be a difficult surgery with scar tissue due to her abdo surgeries.  She expresses her understand. Will stat iv premarin now.         Miranda Donato M.D.

## 2019-03-16 NOTE — PROGRESS NOTES
"Gynecology Progress Note    Bleeding has seemed to slow down. Woke up at 7 am and passed large clots but nothing overnight and nothing currently.    /64   Pulse 79   Temp 36 °C (96.8 °F) (Temporal)   Resp 18   Ht 1.676 m (5' 6\")   Wt 84.3 kg (185 lb 13.6 oz)   LMP 03/15/2019   SpO2 98%   Breastfeeding? No   BMI 30.00 kg/m²     Gen: NAD, sitting up in bed   GI:soft, non tender  :Pad with only 10cc of light blood  Ext: no edema    Recent Labs      03/15/19   1048  03/15/19   1731  03/16/19   0146   WBC  5.4   --   5.4   RBC  2.97*   --   3.16*   HEMOGLOBIN  6.7*  6.6*  7.9*   HEMATOCRIT  23.7*  22.8*  26.0*   MCV  79.8*   --   82.3   MCH  22.6*   --   25.0*   RDW  64.1*   --   59.8*   PLATELETCT  169   --   171   NEUTSPOLYS  87.80*   --    --    LYMPHOCYTES  9.50*   --    --    MONOCYTES  0.90   --    --    EOSINOPHILS  0.90   --    --    BASOPHILS  0.90   --    --    RBCMORPHOLO  Present   --   Present     Assessment:   1. AUB-L  2. Anemia of acute blood loss    Plan: IV estrogen discontinued. OCP BID for 5 days. Improvement in bleeding pattern noted. Now s/p 4uprbc with an inappropriate rise. Most recent unit this AM. Will recheck H/H in 6 hours. If worsening of bleeding noted, possible D&C this weekend. Plan for definitive surgical management in a planned fashion this week.     Nicole Grace M.D.    "

## 2019-03-16 NOTE — PROGRESS NOTES
2 U PRBCs complete and CBC resulted w/hemoglobin of 7.9. Dr. Belcher updated. Order for IV estrogen dropped off previously and physician wanted 3 doses total so 3rd dose ordered to be given 4 hrs after 0200 dose. No new orders regarding blood or labs at this time.

## 2019-03-16 NOTE — PROGRESS NOTES
"Bedside report completed.  A&O x4.  In no acute distress.   BP (!) 97/53   Pulse 68   Temp 36.3 °C (97.4 °F) (Temporal)   Resp 18   Ht 1.676 m (5' 6\")   Wt 84.3 kg (185 lb 13.6 oz)   LMP 03/15/2019   SpO2 97%   Breastfeeding? No   BMI 30.00 kg/m²   1 Unit PRBCs completed, VSS stable after infusion and educated on s/s transfusion rxn and what to report to RN.   Ambulating with no assistance, steady gait.  + nausea, - emesis, medicated per MAR.  Regular diet.   C/o 2/10 cramping, heat packs in use.  Last BM 3/16/19, + flatus.  + void.  Call light and personal belongings within reach.  SCDs refused, patient ambulatory.    POC discussed and all questions answered.  Hourly rounding and fall precautions in place.  No additional needs at this time.  "

## 2019-03-17 LAB
BASOPHILS # BLD AUTO: 0.3 % (ref 0–1.8)
BASOPHILS # BLD: 0.02 K/UL (ref 0–0.12)
COMMENT 1642: NORMAL
EOSINOPHIL # BLD AUTO: 0.11 K/UL (ref 0–0.51)
EOSINOPHIL NFR BLD: 1.9 % (ref 0–6.9)
ERYTHROCYTE [DISTWIDTH] IN BLOOD BY AUTOMATED COUNT: 60.2 FL (ref 35.9–50)
HCT VFR BLD AUTO: 26.3 % (ref 37–47)
HGB BLD-MCNC: 8.1 G/DL (ref 12–16)
IMM GRANULOCYTES # BLD AUTO: 0.01 K/UL (ref 0–0.11)
IMM GRANULOCYTES NFR BLD AUTO: 0.2 % (ref 0–0.9)
LYMPHOCYTES # BLD AUTO: 1.27 K/UL (ref 1–4.8)
LYMPHOCYTES NFR BLD: 21.9 % (ref 22–41)
MCH RBC QN AUTO: 25.5 PG (ref 27–33)
MCHC RBC AUTO-ENTMCNC: 30.8 G/DL (ref 33.6–35)
MCV RBC AUTO: 82.7 FL (ref 81.4–97.8)
MONOCYTES # BLD AUTO: 0.49 K/UL (ref 0–0.85)
MONOCYTES NFR BLD AUTO: 8.4 % (ref 0–13.4)
MORPHOLOGY BLD-IMP: NORMAL
NEUTROPHILS # BLD AUTO: 3.91 K/UL (ref 2–7.15)
NEUTROPHILS NFR BLD: 67.3 % (ref 44–72)
NRBC # BLD AUTO: 0 K/UL
NRBC BLD-RTO: 0 /100 WBC
PLATELET # BLD AUTO: 150 K/UL (ref 164–446)
RBC # BLD AUTO: 3.18 M/UL (ref 4.2–5.4)
WBC # BLD AUTO: 5.8 K/UL (ref 4.8–10.8)

## 2019-03-17 PROCEDURE — 700102 HCHG RX REV CODE 250 W/ 637 OVERRIDE(OP): Performed by: OBSTETRICS & GYNECOLOGY

## 2019-03-17 PROCEDURE — 770006 HCHG ROOM/CARE - MED/SURG/GYN SEMI*

## 2019-03-17 PROCEDURE — 302131 K PAD MOTOR: Performed by: OBSTETRICS & GYNECOLOGY

## 2019-03-17 PROCEDURE — 85025 COMPLETE CBC W/AUTO DIFF WBC: CPT

## 2019-03-17 PROCEDURE — 36415 COLL VENOUS BLD VENIPUNCTURE: CPT

## 2019-03-17 PROCEDURE — 302152 K-PAD 12X17: Performed by: OBSTETRICS & GYNECOLOGY

## 2019-03-17 PROCEDURE — A9270 NON-COVERED ITEM OR SERVICE: HCPCS | Performed by: OBSTETRICS & GYNECOLOGY

## 2019-03-17 RX ADMIN — NORGESTREL AND ETHINYL ESTRADIOL 1 TABLET: KIT at 05:32

## 2019-03-17 RX ADMIN — ACETAMINOPHEN 500 MG: 500 TABLET ORAL at 23:31

## 2019-03-17 RX ADMIN — NORGESTREL AND ETHINYL ESTRADIOL 1 TABLET: KIT at 17:16

## 2019-03-17 RX ADMIN — ACETAMINOPHEN 500 MG: 500 TABLET ORAL at 12:11

## 2019-03-17 RX ADMIN — FERROUS GLUCONATE 324 MG: 324 TABLET ORAL at 17:16

## 2019-03-17 RX ADMIN — TRAZODONE HYDROCHLORIDE 50 MG: 50 TABLET ORAL at 22:00

## 2019-03-17 RX ADMIN — FERROUS GLUCONATE 324 MG: 324 TABLET ORAL at 05:37

## 2019-03-17 NOTE — PROGRESS NOTES
"BP (!) 96/46 Comment: RN notified  Pulse 78   Temp 36.1 °C (97 °F) (Temporal)   Resp 17   Ht 1.676 m (5' 6\")   Wt 84.3 kg (185 lb 13.6 oz)   LMP 03/15/2019   SpO2 100%   Breastfeeding? No   BMI 30.00 kg/m²     Patient c/o increased fatigue and cramping.  5 pads used since 1100 with multiple golf ball sized clots.  No CBC redraw in Epic.  MD paged for orders/updates.      1834: New orders received.    "

## 2019-03-17 NOTE — PROGRESS NOTES
"GYN Progress Note    S: Pt reports changing pad every 2 hours. Small golf ball sized clots when voids. Cramping lower abdomen. Tired. No CP/SOB. No fever/chills.    BP (!) 97/63 Comment: nurse notified  Pulse 68   Temp 36.3 °C (97.4 °F) (Temporal)   Resp 15   Ht 1.676 m (5' 6\")   Wt 84.3 kg (185 lb 13.6 oz)   LMP 03/15/2019   SpO2 92%   Breastfeeding? No   BMI 30.00 kg/m²     Gen: NAD, comfortable, sitting in bed, just showered  Abd: soft, nontender, nondistended, no rebound or guarding  Ext: nontender, no C/C/E  Pad: dry, just changed    Lab Results   Component Value Date/Time    WBC 5.8 2019 03:33 AM    RBC 3.18 (L) 2019 03:33 AM    HEMOGLOBIN 8.1 (L) 2019 03:33 AM    HEMATOCRIT 26.3 (L) 2019 03:33 AM    MCV 82.7 2019 03:33 AM    MCH 25.5 (L) 2019 03:33 AM    MCHC 30.8 (L) 2019 03:33 AM    NEUTSPOLYS 67.30 2019 03:33 AM    LYMPHOCYTES 21.90 (L) 2019 03:33 AM    MONOCYTES 8.40 2019 03:33 AM    EOSINOPHILS 1.90 2019 03:33 AM    BASOPHILS 0.30 2019 03:33 AM    HYPOCHROMIA 1+ 2019 03:08 PM    ANISOCYTOSIS 1+ 2019 02:04 PM        A: 38yo  with Menometrorrhagia  Acute blood loss anemia - s/p 4 uPRBC, Hgb 8.1 today, yesterday was 8.3  Prior  x 5    P: Continue inpatient management, plan for AMBER/bilateral sapingectomy early this week  Recheck CBC in a.m.  Iron BID  Tylenol prn cramping  EMB, pap pending in office  "

## 2019-03-17 NOTE — PROGRESS NOTES
0700: 1 saturated pad  0845: 1 saturated pad  1030: 1 saturated pad     With every urination, at least one large (golf ball sized) blood clot expelled.

## 2019-03-17 NOTE — PROGRESS NOTES
Patient's pad count:      (1) @2100    (2) @2330    (2) @0120    (2) @0540  +__________      7 total during this 12 hour shift.  Patient reports having an increased number of clots.  Patient's hemoglobin treaded from 8.3 to 8.1.

## 2019-03-17 NOTE — PROGRESS NOTES
Assumed care at 1900    Received report from day shift RN.    Reviewed recent lab results, notes, orders, and MAR  POC discussed and updated on care board  Bed is in the lowest and locked position, call light within reach      Patient c/o of 2/10 pain, declines interventions at this time.    Patient would like to shower.  Patient educated about if she feels dizzy how to get a hold of nurse when she is in the shower, and where to sit down.

## 2019-03-17 NOTE — CARE PLAN
Problem: Communication  Goal: The ability to communicate needs accurately and effectively will improve  Outcome: PROGRESSING AS EXPECTED  POC, care board updated    Problem: Safety  Goal: Will remain free from injury  Outcome: PROGRESSING AS EXPECTED  Fall precautions in place, patient educated to call for assistance

## 2019-03-18 LAB
BASOPHILS # BLD AUTO: 0.2 % (ref 0–1.8)
BASOPHILS # BLD: 0.01 K/UL (ref 0–0.12)
EOSINOPHIL # BLD AUTO: 0.11 K/UL (ref 0–0.51)
EOSINOPHIL NFR BLD: 1.8 % (ref 0–6.9)
ERYTHROCYTE [DISTWIDTH] IN BLOOD BY AUTOMATED COUNT: 63.8 FL (ref 35.9–50)
ERYTHROCYTE [DISTWIDTH] IN BLOOD BY AUTOMATED COUNT: 66 FL (ref 35.9–50)
HCT VFR BLD AUTO: 25.7 % (ref 37–47)
HCT VFR BLD AUTO: 31 % (ref 37–47)
HGB BLD-MCNC: 7.5 G/DL (ref 12–16)
HGB BLD-MCNC: 9.3 G/DL (ref 12–16)
IMM GRANULOCYTES # BLD AUTO: 0.02 K/UL (ref 0–0.11)
IMM GRANULOCYTES NFR BLD AUTO: 0.3 % (ref 0–0.9)
LYMPHOCYTES # BLD AUTO: 1.34 K/UL (ref 1–4.8)
LYMPHOCYTES NFR BLD: 21.9 % (ref 22–41)
MCH RBC QN AUTO: 24.8 PG (ref 27–33)
MCH RBC QN AUTO: 26.3 PG (ref 27–33)
MCHC RBC AUTO-ENTMCNC: 29.2 G/DL (ref 33.6–35)
MCHC RBC AUTO-ENTMCNC: 30 G/DL (ref 33.6–35)
MCV RBC AUTO: 85.1 FL (ref 81.4–97.8)
MCV RBC AUTO: 87.8 FL (ref 81.4–97.8)
MONOCYTES # BLD AUTO: 0.64 K/UL (ref 0–0.85)
MONOCYTES NFR BLD AUTO: 10.5 % (ref 0–13.4)
MORPHOLOGY BLD-IMP: NORMAL
MORPHOLOGY BLD-IMP: NORMAL
NEUTROPHILS # BLD AUTO: 4 K/UL (ref 2–7.15)
NEUTROPHILS NFR BLD: 65.3 % (ref 44–72)
NRBC # BLD AUTO: 0 K/UL
NRBC BLD-RTO: 0 /100 WBC
PLATELET # BLD AUTO: 136 K/UL (ref 164–446)
PLATELET # BLD AUTO: 168 K/UL (ref 164–446)
RBC # BLD AUTO: 3.02 M/UL (ref 4.2–5.4)
RBC # BLD AUTO: 3.53 M/UL (ref 4.2–5.4)
WBC # BLD AUTO: 6.1 K/UL (ref 4.8–10.8)
WBC # BLD AUTO: 7.3 K/UL (ref 4.8–10.8)

## 2019-03-18 PROCEDURE — 86923 COMPATIBILITY TEST ELECTRIC: CPT

## 2019-03-18 PROCEDURE — A9270 NON-COVERED ITEM OR SERVICE: HCPCS | Performed by: OBSTETRICS & GYNECOLOGY

## 2019-03-18 PROCEDURE — 700102 HCHG RX REV CODE 250 W/ 637 OVERRIDE(OP): Performed by: OBSTETRICS & GYNECOLOGY

## 2019-03-18 PROCEDURE — 36415 COLL VENOUS BLD VENIPUNCTURE: CPT

## 2019-03-18 PROCEDURE — 36430 TRANSFUSION BLD/BLD COMPNT: CPT

## 2019-03-18 PROCEDURE — 700112 HCHG RX REV CODE 229: Performed by: OBSTETRICS & GYNECOLOGY

## 2019-03-18 PROCEDURE — 85027 COMPLETE CBC AUTOMATED: CPT

## 2019-03-18 PROCEDURE — 85025 COMPLETE CBC W/AUTO DIFF WBC: CPT

## 2019-03-18 PROCEDURE — 770006 HCHG ROOM/CARE - MED/SURG/GYN SEMI*

## 2019-03-18 PROCEDURE — P9016 RBC LEUKOCYTES REDUCED: HCPCS

## 2019-03-18 RX ADMIN — FERROUS GLUCONATE 324 MG: 324 TABLET ORAL at 17:47

## 2019-03-18 RX ADMIN — TRAZODONE HYDROCHLORIDE 50 MG: 50 TABLET ORAL at 22:13

## 2019-03-18 RX ADMIN — NORGESTREL AND ETHINYL ESTRADIOL 1 TABLET: KIT at 17:47

## 2019-03-18 RX ADMIN — NORGESTREL AND ETHINYL ESTRADIOL 1 TABLET: KIT at 05:47

## 2019-03-18 RX ADMIN — FERROUS GLUCONATE 324 MG: 324 TABLET ORAL at 05:47

## 2019-03-18 RX ADMIN — DOCUSATE SODIUM 100 MG: 100 CAPSULE, LIQUID FILLED ORAL at 17:47

## 2019-03-18 NOTE — CARE PLAN
Problem: Communication  Goal: The ability to communicate needs accurately and effectively will improve  Outcome: PROGRESSING AS EXPECTED  POC discussed, care board updated    Problem: Infection  Goal: Will remain free from infection  Outcome: PROGRESSING AS EXPECTED  Practice good hand hygiene, encourage patient to participate in oral care 4x daily

## 2019-03-18 NOTE — PROGRESS NOTES
Assumed care at 1900    Received report from day shift RN.    Reviewed recent lab results, notes, orders, and MAR  POC discussed and updated on care board  Bed is in the lowest and locked position, call light within reach      Patient reports increase in soaked pads during day shift.    Patient c/o of headache medicated per MAR  Up self  RA

## 2019-03-18 NOTE — PROGRESS NOTES
Pt A&Ox4, sitting up in bed.  C/o 3/10 cramping pain, heat pack in use.   Pt to receive blood transfusion this AM. C/o intermittent dizziness, instructed pt to call for assistance to get up if she continues to feel dizzy.   Tolerating diet, denies n/v. + bowel sounds, + flatus, LBM yesterday 3/17.  Saturating >90% on RA.  Pt ambulates independently with steady gait.  Updated on plan of care. Safety education provided. Bed locked in low. Call light within reach. Rounding in place.

## 2019-03-18 NOTE — PROGRESS NOTES
Pt asking about receiving FFP as MD previously had mentioned was a possibility. Called MD on call, Dr. Wesley. MD states he will collaborate with the other doctors in his group who are following the patient and will call this RN back. Provided call back information.

## 2019-03-18 NOTE — PROGRESS NOTES
1630: 3 saturated pads  1740: 3 saturated pads  1845: 1 saturated pad     Golf ball sized clots continue to be expelled with each trip to restroom.

## 2019-03-18 NOTE — PROGRESS NOTES
GYN Progress Note    S: minimal bleeding overnight, states yesterday during day would change pad every 2 hours. Feels tired but no other complaints.    Vitals:    19 0545   BP: (!) 91/51   Pulse: 74   Resp: 18   Temp: 36.1 °C (96.9 °F)   SpO2: 100%     Gen: NAD, comfortable  Abd: soft, nontender, nondistended, no rebound or guarding  Ext: nontender, no C/C/E    Lab Results   Component Value Date/Time    WBC 6.1 2019 01:30 AM    RBC 3.02 (L) 2019 01:30 AM    HEMOGLOBIN 7.5 (L) 2019 01:30 AM    HEMATOCRIT 25.7 (L) 2019 01:30 AM    MCV 85.1 2019 01:30 AM    MCH 24.8 (L) 2019 01:30 AM    MCHC 29.2 (L) 2019 01:30 AM    NEUTSPOLYS 65.30 2019 01:30 AM    LYMPHOCYTES 21.90 (L) 2019 01:30 AM    MONOCYTES 10.50 2019 01:30 AM    EOSINOPHILS 1.80 2019 01:30 AM    BASOPHILS 0.20 2019 01:30 AM    HYPOCHROMIA 1+ 2019 03:08 PM    ANISOCYTOSIS 1+ 2019 02:04 PM        A: 38yo  with menometrorrhagia  Acute blood loss anemia due to AUB - s/p 4u pRBC, Hgb dropped to 7.5 today  Prior  x 5    P: Continue inpatient management, plan for AMBER/BS ASAP  Transfuse 1u pRBC in prep for OR  Cont Iron BID  Cont tylenol prn

## 2019-03-19 ENCOUNTER — ANESTHESIA (OUTPATIENT)
Dept: SURGERY | Facility: MEDICAL CENTER | Age: 39
DRG: 742 | End: 2019-03-19
Payer: COMMERCIAL

## 2019-03-19 ENCOUNTER — ANESTHESIA EVENT (OUTPATIENT)
Dept: SURGERY | Facility: MEDICAL CENTER | Age: 39
DRG: 742 | End: 2019-03-19
Payer: COMMERCIAL

## 2019-03-19 PROBLEM — N92.0 MENORRHAGIA: Status: ACTIVE | Noted: 2019-03-19

## 2019-03-19 LAB
ABO GROUP BLD: NORMAL
ANISOCYTOSIS BLD QL SMEAR: ABNORMAL
BASOPHILS # BLD AUTO: 0.4 % (ref 0–1.8)
BASOPHILS # BLD: 0.02 K/UL (ref 0–0.12)
BLD GP AB SCN SERPL QL: NORMAL
COMMENT 1642: NORMAL
EOSINOPHIL # BLD AUTO: 0.1 K/UL (ref 0–0.51)
EOSINOPHIL NFR BLD: 1.9 % (ref 0–6.9)
ERYTHROCYTE [DISTWIDTH] IN BLOOD BY AUTOMATED COUNT: 65.5 FL (ref 35.9–50)
ERYTHROCYTE [DISTWIDTH] IN BLOOD BY AUTOMATED COUNT: 67.2 FL (ref 35.9–50)
GIANT PLATELETS BLD QL SMEAR: NORMAL
HCT VFR BLD AUTO: 28.6 % (ref 37–47)
HCT VFR BLD AUTO: 29.4 % (ref 37–47)
HGB BLD-MCNC: 8.5 G/DL (ref 12–16)
HGB BLD-MCNC: 8.6 G/DL (ref 12–16)
IMM GRANULOCYTES # BLD AUTO: 0.02 K/UL (ref 0–0.11)
IMM GRANULOCYTES NFR BLD AUTO: 0.4 % (ref 0–0.9)
INR PPP: 1.27 (ref 0.87–1.13)
LYMPHOCYTES # BLD AUTO: 0.95 K/UL (ref 1–4.8)
LYMPHOCYTES NFR BLD: 18.1 % (ref 22–41)
MCH RBC QN AUTO: 25.6 PG (ref 27–33)
MCH RBC QN AUTO: 26 PG (ref 27–33)
MCHC RBC AUTO-ENTMCNC: 29.3 G/DL (ref 33.6–35)
MCHC RBC AUTO-ENTMCNC: 29.7 G/DL (ref 33.6–35)
MCV RBC AUTO: 87.5 FL (ref 81.4–97.8)
MCV RBC AUTO: 87.5 FL (ref 81.4–97.8)
MICROCYTES BLD QL SMEAR: ABNORMAL
MONOCYTES # BLD AUTO: 0.47 K/UL (ref 0–0.85)
MONOCYTES NFR BLD AUTO: 9 % (ref 0–13.4)
MORPHOLOGY BLD-IMP: NORMAL
NEUTROPHILS # BLD AUTO: 3.69 K/UL (ref 2–7.15)
NEUTROPHILS NFR BLD: 70.2 % (ref 44–72)
NRBC # BLD AUTO: 0 K/UL
NRBC BLD-RTO: 0 /100 WBC
OVALOCYTES BLD QL SMEAR: NORMAL
PATHOLOGY CONSULT NOTE: NORMAL
PLATELET # BLD AUTO: 145 K/UL (ref 164–446)
PLATELET # BLD AUTO: 159 K/UL (ref 164–446)
PLATELET BLD QL SMEAR: NORMAL
POIKILOCYTOSIS BLD QL SMEAR: NORMAL
POLYCHROMASIA BLD QL SMEAR: NORMAL
PROTHROMBIN TIME: 16 SEC (ref 12–14.6)
RBC # BLD AUTO: 3.27 M/UL (ref 4.2–5.4)
RBC # BLD AUTO: 3.36 M/UL (ref 4.2–5.4)
RBC BLD AUTO: PRESENT
RH BLD: NORMAL
SMUDGE CELLS BLD QL SMEAR: NORMAL
WBC # BLD AUTO: 10.9 K/UL (ref 4.8–10.8)
WBC # BLD AUTO: 5.3 K/UL (ref 4.8–10.8)

## 2019-03-19 PROCEDURE — 700102 HCHG RX REV CODE 250 W/ 637 OVERRIDE(OP): Performed by: ANESTHESIOLOGY

## 2019-03-19 PROCEDURE — 501838 HCHG SUTURE GENERAL: Performed by: OBSTETRICS & GYNECOLOGY

## 2019-03-19 PROCEDURE — 160002 HCHG RECOVERY MINUTES (STAT): Performed by: OBSTETRICS & GYNECOLOGY

## 2019-03-19 PROCEDURE — 700102 HCHG RX REV CODE 250 W/ 637 OVERRIDE(OP): Performed by: OBSTETRICS & GYNECOLOGY

## 2019-03-19 PROCEDURE — 85025 COMPLETE CBC W/AUTO DIFF WBC: CPT

## 2019-03-19 PROCEDURE — 86901 BLOOD TYPING SEROLOGIC RH(D): CPT

## 2019-03-19 PROCEDURE — 700101 HCHG RX REV CODE 250

## 2019-03-19 PROCEDURE — 700111 HCHG RX REV CODE 636 W/ 250 OVERRIDE (IP): Performed by: ANESTHESIOLOGY

## 2019-03-19 PROCEDURE — 85027 COMPLETE CBC AUTOMATED: CPT

## 2019-03-19 PROCEDURE — 700112 HCHG RX REV CODE 229: Performed by: OBSTETRICS & GYNECOLOGY

## 2019-03-19 PROCEDURE — 700105 HCHG RX REV CODE 258: Performed by: ANESTHESIOLOGY

## 2019-03-19 PROCEDURE — 86850 RBC ANTIBODY SCREEN: CPT

## 2019-03-19 PROCEDURE — 700101 HCHG RX REV CODE 250: Performed by: ANESTHESIOLOGY

## 2019-03-19 PROCEDURE — 700111 HCHG RX REV CODE 636 W/ 250 OVERRIDE (IP): Performed by: OBSTETRICS & GYNECOLOGY

## 2019-03-19 PROCEDURE — 160041 HCHG SURGERY MINUTES - EA ADDL 1 MIN LEVEL 4: Performed by: OBSTETRICS & GYNECOLOGY

## 2019-03-19 PROCEDURE — 36415 COLL VENOUS BLD VENIPUNCTURE: CPT

## 2019-03-19 PROCEDURE — A9270 NON-COVERED ITEM OR SERVICE: HCPCS | Performed by: OBSTETRICS & GYNECOLOGY

## 2019-03-19 PROCEDURE — 0UT90ZZ RESECTION OF UTERUS, OPEN APPROACH: ICD-10-PCS | Performed by: OBSTETRICS & GYNECOLOGY

## 2019-03-19 PROCEDURE — A9270 NON-COVERED ITEM OR SERVICE: HCPCS

## 2019-03-19 PROCEDURE — 85610 PROTHROMBIN TIME: CPT

## 2019-03-19 PROCEDURE — 84702 CHORIONIC GONADOTROPIN TEST: CPT

## 2019-03-19 PROCEDURE — 700111 HCHG RX REV CODE 636 W/ 250 OVERRIDE (IP)

## 2019-03-19 PROCEDURE — 160029 HCHG SURGERY MINUTES - 1ST 30 MINS LEVEL 4: Performed by: OBSTETRICS & GYNECOLOGY

## 2019-03-19 PROCEDURE — 160036 HCHG PACU - EA ADDL 30 MINS PHASE I: Performed by: OBSTETRICS & GYNECOLOGY

## 2019-03-19 PROCEDURE — 160035 HCHG PACU - 1ST 60 MINS PHASE I: Performed by: OBSTETRICS & GYNECOLOGY

## 2019-03-19 PROCEDURE — 160009 HCHG ANES TIME/MIN: Performed by: OBSTETRICS & GYNECOLOGY

## 2019-03-19 PROCEDURE — 700102 HCHG RX REV CODE 250 W/ 637 OVERRIDE(OP)

## 2019-03-19 PROCEDURE — 502704 HCHG DEVICE, LIGASURE IMPACT: Performed by: OBSTETRICS & GYNECOLOGY

## 2019-03-19 PROCEDURE — A9270 NON-COVERED ITEM OR SERVICE: HCPCS | Performed by: ANESTHESIOLOGY

## 2019-03-19 PROCEDURE — 770001 HCHG ROOM/CARE - MED/SURG/GYN PRIV*

## 2019-03-19 PROCEDURE — 0UT70ZZ RESECTION OF BILATERAL FALLOPIAN TUBES, OPEN APPROACH: ICD-10-PCS | Performed by: OBSTETRICS & GYNECOLOGY

## 2019-03-19 PROCEDURE — 502240 HCHG MISC OR SUPPLY RC 0272: Performed by: OBSTETRICS & GYNECOLOGY

## 2019-03-19 PROCEDURE — 86900 BLOOD TYPING SEROLOGIC ABO: CPT

## 2019-03-19 PROCEDURE — 160048 HCHG OR STATISTICAL LEVEL 1-5: Performed by: OBSTETRICS & GYNECOLOGY

## 2019-03-19 PROCEDURE — 88307 TISSUE EXAM BY PATHOLOGIST: CPT

## 2019-03-19 RX ORDER — SODIUM CHLORIDE, SODIUM LACTATE, POTASSIUM CHLORIDE, CALCIUM CHLORIDE 600; 310; 30; 20 MG/100ML; MG/100ML; MG/100ML; MG/100ML
INJECTION, SOLUTION INTRAVENOUS CONTINUOUS
Status: DISCONTINUED | OUTPATIENT
Start: 2019-03-19 | End: 2019-03-19 | Stop reason: HOSPADM

## 2019-03-19 RX ORDER — POLYETHYLENE GLYCOL 3350 17 G/17G
1 POWDER, FOR SOLUTION ORAL 2 TIMES DAILY PRN
Status: DISCONTINUED | OUTPATIENT
Start: 2019-03-19 | End: 2019-03-21 | Stop reason: HOSPADM

## 2019-03-19 RX ORDER — HYDROMORPHONE HYDROCHLORIDE 2 MG/ML
INJECTION, SOLUTION INTRAMUSCULAR; INTRAVENOUS; SUBCUTANEOUS PRN
Status: DISCONTINUED | OUTPATIENT
Start: 2019-03-19 | End: 2019-03-19 | Stop reason: SURG

## 2019-03-19 RX ORDER — SIMETHICONE 80 MG
80 TABLET,CHEWABLE ORAL EVERY 8 HOURS PRN
Status: DISCONTINUED | OUTPATIENT
Start: 2019-03-19 | End: 2019-03-21 | Stop reason: HOSPADM

## 2019-03-19 RX ORDER — MIDAZOLAM HYDROCHLORIDE 1 MG/ML
1 INJECTION INTRAMUSCULAR; INTRAVENOUS
Status: DISCONTINUED | OUTPATIENT
Start: 2019-03-19 | End: 2019-03-19 | Stop reason: HOSPADM

## 2019-03-19 RX ORDER — KETAMINE HYDROCHLORIDE 50 MG/ML
INJECTION, SOLUTION INTRAMUSCULAR; INTRAVENOUS PRN
Status: DISCONTINUED | OUTPATIENT
Start: 2019-03-19 | End: 2019-03-19 | Stop reason: SURG

## 2019-03-19 RX ORDER — DOCUSATE SODIUM 100 MG/1
100 CAPSULE, LIQUID FILLED ORAL 2 TIMES DAILY
Status: DISCONTINUED | OUTPATIENT
Start: 2019-03-19 | End: 2019-03-21 | Stop reason: HOSPADM

## 2019-03-19 RX ORDER — KETOROLAC TROMETHAMINE 30 MG/ML
30 INJECTION, SOLUTION INTRAMUSCULAR; INTRAVENOUS EVERY 6 HOURS
Status: COMPLETED | OUTPATIENT
Start: 2019-03-19 | End: 2019-03-20

## 2019-03-19 RX ORDER — CEFAZOLIN SODIUM 1 G/3ML
INJECTION, POWDER, FOR SOLUTION INTRAMUSCULAR; INTRAVENOUS PRN
Status: DISCONTINUED | OUTPATIENT
Start: 2019-03-19 | End: 2019-03-19 | Stop reason: SURG

## 2019-03-19 RX ORDER — HYDROMORPHONE HYDROCHLORIDE 1 MG/ML
0.1 INJECTION, SOLUTION INTRAMUSCULAR; INTRAVENOUS; SUBCUTANEOUS
Status: DISCONTINUED | OUTPATIENT
Start: 2019-03-19 | End: 2019-03-19 | Stop reason: HOSPADM

## 2019-03-19 RX ORDER — DEXMEDETOMIDINE HYDROCHLORIDE 100 UG/ML
INJECTION, SOLUTION INTRAVENOUS PRN
Status: DISCONTINUED | OUTPATIENT
Start: 2019-03-19 | End: 2019-03-19 | Stop reason: SURG

## 2019-03-19 RX ORDER — HYDRALAZINE HYDROCHLORIDE 20 MG/ML
5 INJECTION INTRAMUSCULAR; INTRAVENOUS
Status: DISCONTINUED | OUTPATIENT
Start: 2019-03-19 | End: 2019-03-19 | Stop reason: HOSPADM

## 2019-03-19 RX ORDER — SODIUM CHLORIDE, SODIUM LACTATE, POTASSIUM CHLORIDE, CALCIUM CHLORIDE 600; 310; 30; 20 MG/100ML; MG/100ML; MG/100ML; MG/100ML
INJECTION, SOLUTION INTRAVENOUS
Status: DISCONTINUED | OUTPATIENT
Start: 2019-03-19 | End: 2019-03-19 | Stop reason: SURG

## 2019-03-19 RX ORDER — ONDANSETRON 2 MG/ML
INJECTION INTRAMUSCULAR; INTRAVENOUS PRN
Status: DISCONTINUED | OUTPATIENT
Start: 2019-03-19 | End: 2019-03-19 | Stop reason: SURG

## 2019-03-19 RX ORDER — DEXAMETHASONE SODIUM PHOSPHATE 4 MG/ML
INJECTION, SOLUTION INTRA-ARTICULAR; INTRALESIONAL; INTRAMUSCULAR; INTRAVENOUS; SOFT TISSUE PRN
Status: DISCONTINUED | OUTPATIENT
Start: 2019-03-19 | End: 2019-03-19 | Stop reason: SURG

## 2019-03-19 RX ORDER — OXYCODONE HYDROCHLORIDE 5 MG/1
5 TABLET ORAL
Status: DISCONTINUED | OUTPATIENT
Start: 2019-03-19 | End: 2019-03-21

## 2019-03-19 RX ORDER — KETAMINE HYDROCHLORIDE 50 MG/ML
INJECTION, SOLUTION INTRAMUSCULAR; INTRAVENOUS
Status: COMPLETED
Start: 2019-03-19 | End: 2019-03-19

## 2019-03-19 RX ORDER — DIPHENHYDRAMINE HYDROCHLORIDE 50 MG/ML
12.5 INJECTION INTRAMUSCULAR; INTRAVENOUS
Status: COMPLETED | OUTPATIENT
Start: 2019-03-19 | End: 2019-03-19

## 2019-03-19 RX ORDER — SODIUM CHLORIDE, SODIUM LACTATE, POTASSIUM CHLORIDE, CALCIUM CHLORIDE 600; 310; 30; 20 MG/100ML; MG/100ML; MG/100ML; MG/100ML
INJECTION, SOLUTION INTRAVENOUS ONCE
Status: COMPLETED | OUTPATIENT
Start: 2019-03-19 | End: 2019-03-19

## 2019-03-19 RX ORDER — DIPHENHYDRAMINE HYDROCHLORIDE 50 MG/ML
25 INJECTION INTRAMUSCULAR; INTRAVENOUS EVERY 6 HOURS PRN
Status: DISCONTINUED | OUTPATIENT
Start: 2019-03-19 | End: 2019-03-21 | Stop reason: HOSPADM

## 2019-03-19 RX ORDER — SCOLOPAMINE TRANSDERMAL SYSTEM 1 MG/1
PATCH, EXTENDED RELEASE TRANSDERMAL
Status: DISPENSED
Start: 2019-03-19 | End: 2019-03-20

## 2019-03-19 RX ORDER — OXYCODONE HCL 5 MG/5 ML
10 SOLUTION, ORAL ORAL
Status: DISCONTINUED | OUTPATIENT
Start: 2019-03-19 | End: 2019-03-19 | Stop reason: HOSPADM

## 2019-03-19 RX ORDER — DEXMEDETOMIDINE HYDROCHLORIDE 100 UG/ML
INJECTION, SOLUTION INTRAVENOUS
Status: COMPLETED
Start: 2019-03-19 | End: 2019-03-19

## 2019-03-19 RX ORDER — ACETAMINOPHEN 500 MG
1000 TABLET ORAL EVERY 6 HOURS
Status: DISCONTINUED | OUTPATIENT
Start: 2019-03-19 | End: 2019-03-21

## 2019-03-19 RX ORDER — HYDROMORPHONE HYDROCHLORIDE 1 MG/ML
0.5 INJECTION, SOLUTION INTRAMUSCULAR; INTRAVENOUS; SUBCUTANEOUS
Status: DISCONTINUED | OUTPATIENT
Start: 2019-03-19 | End: 2019-03-21

## 2019-03-19 RX ORDER — HALOPERIDOL 5 MG/ML
1 INJECTION INTRAMUSCULAR EVERY 6 HOURS PRN
Status: DISCONTINUED | OUTPATIENT
Start: 2019-03-19 | End: 2019-03-21

## 2019-03-19 RX ORDER — ACETAMINOPHEN 500 MG
1000 TABLET ORAL ONCE
Status: COMPLETED | OUTPATIENT
Start: 2019-03-19 | End: 2019-03-19

## 2019-03-19 RX ORDER — SCOLOPAMINE TRANSDERMAL SYSTEM 1 MG/1
1 PATCH, EXTENDED RELEASE TRANSDERMAL
Status: DISCONTINUED | OUTPATIENT
Start: 2019-03-19 | End: 2019-03-21 | Stop reason: HOSPADM

## 2019-03-19 RX ORDER — DEXAMETHASONE SODIUM PHOSPHATE 4 MG/ML
4 INJECTION, SOLUTION INTRA-ARTICULAR; INTRALESIONAL; INTRAMUSCULAR; INTRAVENOUS; SOFT TISSUE
Status: DISCONTINUED | OUTPATIENT
Start: 2019-03-19 | End: 2019-03-21

## 2019-03-19 RX ORDER — ONDANSETRON 2 MG/ML
4 INJECTION INTRAMUSCULAR; INTRAVENOUS
Status: DISCONTINUED | OUTPATIENT
Start: 2019-03-19 | End: 2019-03-19 | Stop reason: HOSPADM

## 2019-03-19 RX ORDER — HYDROMORPHONE HYDROCHLORIDE 1 MG/ML
0.4 INJECTION, SOLUTION INTRAMUSCULAR; INTRAVENOUS; SUBCUTANEOUS
Status: DISCONTINUED | OUTPATIENT
Start: 2019-03-19 | End: 2019-03-19 | Stop reason: HOSPADM

## 2019-03-19 RX ORDER — METOPROLOL TARTRATE 1 MG/ML
1 INJECTION, SOLUTION INTRAVENOUS
Status: DISCONTINUED | OUTPATIENT
Start: 2019-03-19 | End: 2019-03-19 | Stop reason: HOSPADM

## 2019-03-19 RX ORDER — HALOPERIDOL 5 MG/ML
1 INJECTION INTRAMUSCULAR
Status: DISCONTINUED | OUTPATIENT
Start: 2019-03-19 | End: 2019-03-19 | Stop reason: HOSPADM

## 2019-03-19 RX ORDER — OXYCODONE HYDROCHLORIDE 5 MG/1
10 TABLET ORAL
Status: DISCONTINUED | OUTPATIENT
Start: 2019-03-19 | End: 2019-03-21

## 2019-03-19 RX ORDER — MEPERIDINE HYDROCHLORIDE 25 MG/ML
12.5 INJECTION INTRAMUSCULAR; INTRAVENOUS; SUBCUTANEOUS
Status: DISCONTINUED | OUTPATIENT
Start: 2019-03-19 | End: 2019-03-19 | Stop reason: HOSPADM

## 2019-03-19 RX ORDER — ONDANSETRON 2 MG/ML
4 INJECTION INTRAMUSCULAR; INTRAVENOUS EVERY 4 HOURS PRN
Status: DISCONTINUED | OUTPATIENT
Start: 2019-03-19 | End: 2019-03-21 | Stop reason: HOSPADM

## 2019-03-19 RX ORDER — OXYCODONE HCL 5 MG/5 ML
5 SOLUTION, ORAL ORAL
Status: DISCONTINUED | OUTPATIENT
Start: 2019-03-19 | End: 2019-03-19 | Stop reason: HOSPADM

## 2019-03-19 RX ORDER — MIDAZOLAM HYDROCHLORIDE 1 MG/ML
INJECTION INTRAMUSCULAR; INTRAVENOUS
Status: COMPLETED
Start: 2019-03-19 | End: 2019-03-19

## 2019-03-19 RX ORDER — HYDROMORPHONE HYDROCHLORIDE 1 MG/ML
0.2 INJECTION, SOLUTION INTRAMUSCULAR; INTRAVENOUS; SUBCUTANEOUS
Status: DISCONTINUED | OUTPATIENT
Start: 2019-03-19 | End: 2019-03-19 | Stop reason: HOSPADM

## 2019-03-19 RX ORDER — CELECOXIB 200 MG/1
400 CAPSULE ORAL ONCE
Status: COMPLETED | OUTPATIENT
Start: 2019-03-19 | End: 2019-03-19

## 2019-03-19 RX ADMIN — FENTANYL CITRATE 50 MCG: 50 INJECTION, SOLUTION INTRAMUSCULAR; INTRAVENOUS at 14:35

## 2019-03-19 RX ADMIN — SUGAMMADEX 200 MG: 100 INJECTION, SOLUTION INTRAVENOUS at 15:52

## 2019-03-19 RX ADMIN — ROCURONIUM BROMIDE 50 MG: 10 INJECTION, SOLUTION INTRAVENOUS at 14:05

## 2019-03-19 RX ADMIN — ROCURONIUM BROMIDE 10 MG: 10 INJECTION, SOLUTION INTRAVENOUS at 15:09

## 2019-03-19 RX ADMIN — SODIUM CHLORIDE, POTASSIUM CHLORIDE, SODIUM LACTATE AND CALCIUM CHLORIDE: 600; 310; 30; 20 INJECTION, SOLUTION INTRAVENOUS at 13:50

## 2019-03-19 RX ADMIN — FENTANYL CITRATE 50 MCG: 50 INJECTION, SOLUTION INTRAMUSCULAR; INTRAVENOUS at 14:11

## 2019-03-19 RX ADMIN — EPHEDRINE SULFATE 10 MG: 50 INJECTION INTRAMUSCULAR; INTRAVENOUS; SUBCUTANEOUS at 14:28

## 2019-03-19 RX ADMIN — LIDOCAINE HYDROCHLORIDE 60 MG: 20 INJECTION, SOLUTION INFILTRATION; PERINEURAL at 14:02

## 2019-03-19 RX ADMIN — DEXAMETHASONE SODIUM PHOSPHATE 8 MG: 4 INJECTION, SOLUTION INTRAMUSCULAR; INTRAVENOUS at 14:26

## 2019-03-19 RX ADMIN — HYDROMORPHONE HYDROCHLORIDE 1 MG: 2 INJECTION, SOLUTION INTRAMUSCULAR; INTRAVENOUS; SUBCUTANEOUS at 16:04

## 2019-03-19 RX ADMIN — PROPOFOL 150 MG: 10 INJECTION, EMULSION INTRAVENOUS at 14:02

## 2019-03-19 RX ADMIN — ROCURONIUM BROMIDE 10 MG: 10 INJECTION, SOLUTION INTRAVENOUS at 15:22

## 2019-03-19 RX ADMIN — HYDROMORPHONE HYDROCHLORIDE 0.5 MG: 1 INJECTION, SOLUTION INTRAMUSCULAR; INTRAVENOUS; SUBCUTANEOUS at 21:58

## 2019-03-19 RX ADMIN — ACETAMINOPHEN 1000 MG: 500 TABLET, FILM COATED ORAL at 13:13

## 2019-03-19 RX ADMIN — HYDROMORPHONE HYDROCHLORIDE 0.5 MG: 1 INJECTION, SOLUTION INTRAMUSCULAR; INTRAVENOUS; SUBCUTANEOUS at 16:57

## 2019-03-19 RX ADMIN — EPHEDRINE SULFATE 5 MG: 50 INJECTION INTRAMUSCULAR; INTRAVENOUS; SUBCUTANEOUS at 15:55

## 2019-03-19 RX ADMIN — KETOROLAC TROMETHAMINE 30 MG: 30 INJECTION, SOLUTION INTRAMUSCULAR; INTRAVENOUS at 19:01

## 2019-03-19 RX ADMIN — OXYCODONE HYDROCHLORIDE 10 MG: 5 TABLET ORAL at 19:01

## 2019-03-19 RX ADMIN — HYDROMORPHONE HYDROCHLORIDE 0.4 MG: 2 INJECTION, SOLUTION INTRAMUSCULAR; INTRAVENOUS; SUBCUTANEOUS at 15:52

## 2019-03-19 RX ADMIN — SODIUM CHLORIDE, POTASSIUM CHLORIDE, SODIUM LACTATE AND CALCIUM CHLORIDE: 600; 310; 30; 20 INJECTION, SOLUTION INTRAVENOUS at 15:20

## 2019-03-19 RX ADMIN — CEFAZOLIN 2 G: 330 INJECTION, POWDER, FOR SOLUTION INTRAMUSCULAR; INTRAVENOUS at 14:05

## 2019-03-19 RX ADMIN — DEXAMETHASONE SODIUM PHOSPHATE 8 MG: 4 INJECTION, SOLUTION INTRAMUSCULAR; INTRAVENOUS at 14:10

## 2019-03-19 RX ADMIN — ONDANSETRON 4 MG: 2 INJECTION INTRAMUSCULAR; INTRAVENOUS at 15:48

## 2019-03-19 RX ADMIN — MIDAZOLAM HYDROCHLORIDE 2 MG: 1 INJECTION, SOLUTION INTRAMUSCULAR; INTRAVENOUS at 13:52

## 2019-03-19 RX ADMIN — DEXMEDETOMIDINE HYDROCHLORIDE 20 MCG: 100 INJECTION, SOLUTION INTRAVENOUS at 16:04

## 2019-03-19 RX ADMIN — EPHEDRINE SULFATE 5 MG: 50 INJECTION INTRAMUSCULAR; INTRAVENOUS; SUBCUTANEOUS at 15:12

## 2019-03-19 RX ADMIN — FENTANYL CITRATE 50 MCG: 50 INJECTION, SOLUTION INTRAMUSCULAR; INTRAVENOUS at 16:48

## 2019-03-19 RX ADMIN — SODIUM CHLORIDE, SODIUM LACTATE, POTASSIUM CHLORIDE, CALCIUM CHLORIDE: 600; 310; 30; 20 INJECTION, SOLUTION INTRAVENOUS at 13:13

## 2019-03-19 RX ADMIN — HYDROMORPHONE HYDROCHLORIDE 0.1 MG: 1 INJECTION, SOLUTION INTRAMUSCULAR; INTRAVENOUS; SUBCUTANEOUS at 17:45

## 2019-03-19 RX ADMIN — HYDROMORPHONE HYDROCHLORIDE 0.4 MG: 1 INJECTION, SOLUTION INTRAMUSCULAR; INTRAVENOUS; SUBCUTANEOUS at 17:09

## 2019-03-19 RX ADMIN — ACETAMINOPHEN 1000 MG: 500 TABLET ORAL at 19:00

## 2019-03-19 RX ADMIN — DEXMEDETOMIDINE HYDROCHLORIDE 40 MCG: 100 INJECTION, SOLUTION INTRAVENOUS at 14:41

## 2019-03-19 RX ADMIN — CELECOXIB 400 MG: 200 CAPSULE ORAL at 13:13

## 2019-03-19 RX ADMIN — DIPHENHYDRAMINE HYDROCHLORIDE 12.5 MG: 50 INJECTION INTRAMUSCULAR; INTRAVENOUS at 16:48

## 2019-03-19 RX ADMIN — DEXMEDETOMIDINE HYDROCHLORIDE 20 MCG: 100 INJECTION, SOLUTION INTRAVENOUS at 15:22

## 2019-03-19 RX ADMIN — DOCUSATE SODIUM 100 MG: 100 CAPSULE, LIQUID FILLED ORAL at 19:01

## 2019-03-19 RX ADMIN — ONDANSETRON 4 MG: 2 INJECTION INTRAMUSCULAR; INTRAVENOUS at 19:01

## 2019-03-19 RX ADMIN — FENTANYL CITRATE 50 MCG: 50 INJECTION, SOLUTION INTRAMUSCULAR; INTRAVENOUS at 16:15

## 2019-03-19 RX ADMIN — EPHEDRINE SULFATE 10 MG: 50 INJECTION INTRAMUSCULAR; INTRAVENOUS; SUBCUTANEOUS at 14:15

## 2019-03-19 RX ADMIN — HYDROMORPHONE HYDROCHLORIDE 0.6 MG: 2 INJECTION, SOLUTION INTRAMUSCULAR; INTRAVENOUS; SUBCUTANEOUS at 15:28

## 2019-03-19 RX ADMIN — ROCURONIUM BROMIDE 10 MG: 10 INJECTION, SOLUTION INTRAVENOUS at 14:34

## 2019-03-19 RX ADMIN — DIPHENHYDRAMINE HYDROCHLORIDE 12.5 MG: 50 INJECTION INTRAMUSCULAR; INTRAVENOUS at 16:23

## 2019-03-19 RX ADMIN — KETAMINE HYDROCHLORIDE 50 MG: 50 INJECTION, SOLUTION, CONCENTRATE INTRAMUSCULAR; INTRAVENOUS at 14:20

## 2019-03-19 ASSESSMENT — PAIN SCALES - GENERAL: PAIN_LEVEL: 5

## 2019-03-19 NOTE — ANESTHESIA POSTPROCEDURE EVALUATION
Patient: Stacey Francisco    Procedure Summary     Date:  03/19/19 Room / Location:  [facility] ROOM 28 / SURGERY SAME DAY [facility]    Anesthesia Start:  1350 Anesthesia Stop:  1609    Procedures:       HYSTERECTOMY, TOTAL, ABDOMINAL (Abdomen)      SALPINGECTOMY (Bilateral Fallopian Tube) Diagnosis:       Fibroid uterus      (Symptomatic Fibroid Uterus, Unresponsive to medical management)    Surgeon:  Miranda Donato M.D. Responsible Provider:  Aureliano Pinedo M.D.    Anesthesia Type:  general ASA Status:  2          Final Anesthesia Type: general  Last vitals  BP   Blood Pressure: 109/62, NIBP: 135/66    Temp   37.9 °C (100.3 °F)    Pulse   Pulse: 94, Heart Rate (Monitored): 93   Resp   16    SpO2   95 %      Anesthesia Post Evaluation    Patient location during evaluation: PACU  Patient participation: complete - patient participated  Level of consciousness: awake and alert  Pain score: 5    Airway patency: patent  Anesthetic complications: no  Cardiovascular status: hemodynamically stable  Respiratory status: acceptable  Hydration status: euvolemic    PONV: none           Nurse Pain Score: 0 (NPRS)

## 2019-03-19 NOTE — OR NURSING
1607 Rec'd report from . VSS. No s/s of resp distress. Art line in place, d/c'd-held pressure 5min. Peripad in place, CDI. Wong in place, draining yellow urine.   1620 Pt having 5/10 pain-see MAR  1630 Pt states she gets itchy from fentanyl, medicated for itching. SEE MAR  1700 Continuing to medicate for pain, see MAR. Pt tolerating sips of water.   1715 Report to Liz JETER RN

## 2019-03-19 NOTE — ANESTHESIA PREPROCEDURE EVALUATION
Relevant Problems   (+) Menorrhagia       Physical Exam    Airway   Mallampati: II  TM distance: >3 FB  Neck ROM: full       Cardiovascular - normal exam  Rhythm: regular  Rate: normal  (-) murmur     Dental - normal exam         Pulmonary - normal exam  Breath sounds clear to auscultation     Abdominal    Neurological - normal exam                 Anesthesia Plan    ASA 2       Plan - general       Airway plan will be ETT        Induction: intravenous    Postoperative Plan: Postoperative administration of opioids is intended.    Pertinent diagnostic labs and testing reviewed    Informed Consent:    Anesthetic plan and risks discussed with patient.    Use of blood products discussed with: patient whom consented to blood products.

## 2019-03-19 NOTE — ANESTHESIA PROCEDURE NOTES
Arterial Line  Performed by: ARYA MENDOZA  Authorized by: ARYA MENDOZA     Start Time:  3/19/2019 2:10 PM  End Time:  3/19/2019 2:14 PM  Localization: ultrasound guidance and surface landmarks    Patient Location:  OR  Indication: continuous blood pressure monitoring and blood sampling needed    Catheter Size:  20 G  Seldinger Technique?: Yes    Laterality:  Left  Site:  Radial artery  Line Secured:  Antimicrobial disc, tape and transparent dressing  Events: patient tolerated procedure well with no complications

## 2019-03-19 NOTE — ANESTHESIA TIME REPORT
Anesthesia Start and Stop Event Times     Date Time Event    3/19/2019 1350 Anesthesia Start     1609 Anesthesia Stop        Responsible Staff  03/19/19    Name Role Begin End    Arya Carrion M.D. Anesth 1350 1515    Aureliano Pinedo M.D. Anesth 1515 1609        Post op Diagnosis  Fibroid uterus    Premium Reason  A. 3PM - 7AM      Exception Times    Start Time             End Time                     #                         Name         3/19/2019 1:50 PM 3/19/2019 3:15 PM 71 ARYA CARRION   3/19/2019 3:15 PM 3/19/2019 4:09 PM 62 AURELIANO PINEDO                            Comments:

## 2019-03-19 NOTE — CARE PLAN
Problem: Venous Thromboembolism (VTW)/Deep Vein Thrombosis (DVT) Prevention:  Goal: Patient will participate in Venous Thrombosis (VTE)/Deep Vein Thrombosis (DVT)Prevention Measures  Outcome: PROGRESSING AS EXPECTED  SCD's and ambulation. Pt bleeding, no pharmacologic prophylaxis.     Problem: Pain Management  Goal: Pain level will decrease to patient's comfort goal  Outcome: PROGRESSING AS EXPECTED  Pt with complaints of cramping today. Declines pharmacologic interventions. K-pad in use for heating pad.

## 2019-03-19 NOTE — OR NURSING
Case was cancelled by Dr. Donato. Phoned Dr. Donato to place admit order, MD to put in order. Awaiting for bed assignment. Patient's needs met, call light within reach.

## 2019-03-19 NOTE — PROGRESS NOTES
Dr. Donato called to update this RN and pt that surgery is scheduled for 1330 tomorrow afternoon. Per MD, pt is to be NPO 8hr prior. Updated order. Updated pt on plan of care.

## 2019-03-19 NOTE — PROGRESS NOTES
Received report from day shift RN and assumed care of patient.  Patient A&Ox4.  No complaints of nausea or vomiting. Tolerating regular diet.      Patient will NPO after midnight.  Patient states she understands that she can not eat nor drink after midnight for procedure in morning.    Patient instructed to call for assistance.  Call light within reach.

## 2019-03-19 NOTE — H&P
DATE OF ADMISSION:  2019    HISTORY OF PRESENT ILLNESS:  The patient is a 39-year-old  5, para 5   who initially presented to the emergency room about a week ago with heavy   bleeding, was on ER call at that time, she was transfused 1 unit of blood at   that time and sent out and to follow up with me in the office, she did follow   up with me in the office last Wednesday and after discussing her options, we   made a decision that she wanted to proceed with hysterectomy for definitive   treatment.  She bleeds heavy periods and has been for sometime.  She was not   on any contraception or anything to alleviate her heavy periods.  She does   have an intramural fibroid on ultrasound.  In the office, she also had   endometrial biopsy, I do not have results back on that at this moment as well   as a Pap smear.  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  Surgery time had   not yet been set up when patient called and was heavy bleeding again and then   she was admitted on Friday, the  with heavy bleeding.  At that time, her   blood count was back down to 6.  She was transfused 1 unit of blood in the ER   and she was evaluated by my partner, Dr. Grace and decision was made to move   towards hysterectomy at that time.  Upon evaluating her myself, she was not   actually bleeding very heavy at the moment I saw her and I made the decision   that since her hemoglobin was low, I wanted to give her 2 more units of blood   and tried some IV Premarin to see if that would decrease her slow of bleeding   prior to moving towards hysterectomy at such a low blood count level.  She is   a previous  x5 and I suspect it might be a difficult surgery if she   has the scar tissue.  Patient was in agreement at that time.  Over the course   of that next day, she was transitioned to oral pills and is still continued to   have bleeding.  She  required 1 additional unit of packed red blood cells   again over the weekend and another unit on Monday.  At this point, she still   has symptomatic anemia, still having failed medical management of her abnormal   uterine bleeding with her fibroid uterus.  We will proceed with total   abdominal hysterectomy with bilateral salpingectomy.  She will retain her   ovaries.    PAST MEDICAL HISTORY:  Otherwise, noncontributory.    PAST SURGICAL HISTORY:  Includes  x5, bilateral tubal ligation,   gastric sleeve, cholecystectomy and appendectomy.    OBSTETRICAL HISTORY:  Five  sections.    GYNECOLOGIC HISTORY:  Denies any abnormal Paps or STDs.    SOCIAL HISTORY:  No alcohol, tobacco or history of drug use.    FAMILY HISTORY:  Diabetes, hypertension and MI.    REVIEW OF SYSTEMS:  Otherwise noncontributory.    PHYSICAL EXAMINATION:  VITAL SIGNS:  Stable.  She is afebrile.  GENERAL:  She is awake, alert and oriented, in no acute distress.  CARDIOVASCULAR:  Regular rate and rhythm.  CHEST:  Clear to auscultation bilaterally.  ABDOMEN:  Soft, nontender, nondistended.  Normal bowel sounds.  She has a lot   of excess skin and tissue from her 100-pound weight loss from her gastric   sleeve.  EXTREMITIES:  No cyanosis, clubbing or edema.  PELVIC:  Deferred, see H and P from admission.    LABORATORY DATA:  Today, hemoglobin 8.5, hematocrit 28.6, platelet count is   145,000.  PT 16.0, INR 1.27.  Pregnancy test is negative.  Chemistry panel   last was the , all appeared normal.  Pelvic ultrasound again revealed   uterine body fibroid appears to abut the endometrial lining.    Bilateral normal ovaries seen and no free fluid.    ASSESSMENT AND PLAN:  A 39-year-old female  5, para 5 with symptomatic   anemia, dysfunctional uterine bleeding and fibroid uterus, which has been   persistent and intractable bleeding even with medical management.  She desires   to proceed with permanent treatment with the  hysterectomy.  We will plan for   total abdominal hysterectomy and bilateral salpingectomy.  Risks, benefits,   alternatives have been thoroughly discussed with her in the office as well as   by the bedside.  Risks including bleeding, infection, pain, injury to bowel,   bladder, uterus, fallopian tubes, ovaries, major blood vessels or nerves.  She   does desire to retain her profunda.  She does accept blood transfusion if   needed.  She understands that these risks include injury to bladder if there   were significant adhesions from her prior C-sections.  She understands she may   need antibiotics for any infection as well as prolonged hospitalization if   any complications occur as well as further blood transfusions and the risks of   blood transfusions.  She does not desire to proceed with any further medical   management at this time and agrees this time to have this be definitive for   how long she has been bleeding through this course as well as how much blood   products she has required thus far.       ____________________________________     MD JENNI MARK / PEYTON    DD:  03/19/2019 14:01:33  DT:  03/19/2019 15:51:23    D#:  [id number]  Job#:  [id number]

## 2019-03-19 NOTE — ANESTHESIA PROCEDURE NOTES
Airway  Date/Time: 3/19/2019 2:06 PM  Performed by: ARYA MENDOZA  Authorized by: ARYA MENDOZA     Location:  OR  Urgency:  Elective  Difficult Airway: No    Indications for Airway Management:  Anesthesia  Spontaneous Ventilation: absent    Sedation Level:  Deep  Preoxygenated: Yes    Patient Position:  Sniffing  Mask Difficulty Assessment:  1 - vent by mask  Final Airway Type:  Endotracheal airway  Final Endotracheal Airway:  ETT  Cuffed: Yes    Technique Used for Successful ETT Placement:  Direct laryngoscopy  Insertion Site:  Oral  Blade Type:  Christie  Laryngoscope Blade/Videolaryngoscope Blade Size:  3  ETT Size (mm):  7.0  Measured from:  Teeth  ETT to Teeth (cm):  21  Placement Verified by: auscultation and capnometry    Cormack-Lehane Classification:  Grade I - full view of glottis  Number of Attempts at Approach:  1

## 2019-03-19 NOTE — ANESTHESIA QCDR
2019 [facility] Clinical Data Registry (for Quality Improvement)     Postoperative nausea/vomiting risk protocol (Adult = 18 yrs and Pediatric 3-17 yrs)- (430 and 463)  General inhalation anesthetic (NOT TIVA) with PONV risk factors: Yes  Provision of anti-emetic therapy with at least 2 different classes of agents: Yes   Patient DID NOT receive anti-emetic therapy and reason is documented in Medical Record:  N/A    Multimodal Pain Management- (AQI59)  Patient undergoing Elective Surgery (i.e. Outpatient, or ASC, or Prescheduled Surgery prior to Hospital Admission): Yes  Use of Multimodal Pain Management, two or more drugs and/or interventions, NOT including systemic opioids: Yes   Exception: Documented allergy to multiple classes of analgesics:  N/A    PACU assessment of acute postoperative pain prior to Anesthesia Care End- Applies to Patients Age = 18- (ABG7)  Initial PACU pain score is which of the following: < 7/10  Patient unable to report pain score: N/A    Post-anesthetic transfer of care checklist/protocol to PACU/ICU- (426 and 427)  Upon conclusion of case, patient transferred to which of the following locations: PACU/Non-ICU  Use of transfer checklist/protocol: Yes  Exclusion: Service Performed in Patient Hospital Room (and thus did not require transfer): N/A    PACU Reintubation- (AQI31)  General anesthesia requiring endotracheal intubation (ETT) along with subsequent extubation in OR or PACU: Yes  Required reintubation in the PACU: No   Extubation was a planned trial documented in the medical record prior to removal of the original airway device:  N/A    Unplanned admission to ICU related to anesthesia service up through end of PACU care- (MD51)  Unplanned admission to ICU (not initially anticipated at anesthesia start time): No

## 2019-03-19 NOTE — OR SURGEON
Immediate Post OP Note    PreOp Diagnosis: symptomatic fibroid uterus unresponsive to medical management, acute blood loss anemia, Previous  x 5 with Tubal ligation    PostOp Diagnosis: same    Procedure(s):  HYSTERECTOMY, TOTAL, ABDOMINAL - Wound Class: Clean  Bilateral SALPINGECTOMY - Wound Class: Clean    Surgeon(s):  OPAL Thackerthia Archer    Anesthesiologist/Type of Anesthesia:  Anesthesiologist: Melanie Carrion M.D.; Aureliano Pinedo M.D./General    Surgical Staff:  Circulator: Kelly Ferrer RALBIN  Relief Circulator: Katty Obrien R.N.  Relief Scrub: Kiley Lincoln  Scrub Person: Thao Huffman    Specimens removed if any:  ID Type Source Tests Collected by Time Destination   A : uterus, cervix, bilateral fallopian tubes Other Other PATHOLOGY SPECIMEN Miranda Donato M.D. 3/19/2019  2:36 PM        Estimated Blood Loss:200cc  Findings: 8 week size uterus, cali normal ovaries and tubes with small sement of area where tubal ligation was visualized. Bladder flap was scarred, omental adhesions present.     Complications: none.     Dispo to RR stable.     Path: uterus/bilateral tubes      3/19/2019 4:13 PM Miranda Donato M.D.

## 2019-03-20 LAB
B-HCG FREE SERPL-ACNC: <5 MIU/ML
BASOPHILS # BLD AUTO: 0.2 % (ref 0–1.8)
BASOPHILS # BLD: 0.02 K/UL (ref 0–0.12)
EOSINOPHIL # BLD AUTO: 0 K/UL (ref 0–0.51)
EOSINOPHIL NFR BLD: 0 % (ref 0–6.9)
ERYTHROCYTE [DISTWIDTH] IN BLOOD BY AUTOMATED COUNT: 65.2 FL (ref 35.9–50)
HCT VFR BLD AUTO: 27.4 % (ref 37–47)
HGB BLD-MCNC: 8.2 G/DL (ref 12–16)
IMM GRANULOCYTES # BLD AUTO: 0.05 K/UL (ref 0–0.11)
IMM GRANULOCYTES NFR BLD AUTO: 0.4 % (ref 0–0.9)
LYMPHOCYTES # BLD AUTO: 0.46 K/UL (ref 1–4.8)
LYMPHOCYTES NFR BLD: 4.1 % (ref 22–41)
MCH RBC QN AUTO: 25.9 PG (ref 27–33)
MCHC RBC AUTO-ENTMCNC: 29.9 G/DL (ref 33.6–35)
MCV RBC AUTO: 86.7 FL (ref 81.4–97.8)
MONOCYTES # BLD AUTO: 1.14 K/UL (ref 0–0.85)
MONOCYTES NFR BLD AUTO: 10.2 % (ref 0–13.4)
NEUTROPHILS # BLD AUTO: 9.49 K/UL (ref 2–7.15)
NEUTROPHILS NFR BLD: 85.1 % (ref 44–72)
NRBC # BLD AUTO: 0 K/UL
NRBC BLD-RTO: 0 /100 WBC
PLATELET # BLD AUTO: 171 K/UL (ref 164–446)
RBC # BLD AUTO: 3.16 M/UL (ref 4.2–5.4)
WBC # BLD AUTO: 11.2 K/UL (ref 4.8–10.8)

## 2019-03-20 PROCEDURE — 700102 HCHG RX REV CODE 250 W/ 637 OVERRIDE(OP): Performed by: OBSTETRICS & GYNECOLOGY

## 2019-03-20 PROCEDURE — A9270 NON-COVERED ITEM OR SERVICE: HCPCS | Performed by: OBSTETRICS & GYNECOLOGY

## 2019-03-20 PROCEDURE — 700112 HCHG RX REV CODE 229: Performed by: OBSTETRICS & GYNECOLOGY

## 2019-03-20 PROCEDURE — 85025 COMPLETE CBC W/AUTO DIFF WBC: CPT

## 2019-03-20 PROCEDURE — 700111 HCHG RX REV CODE 636 W/ 250 OVERRIDE (IP): Performed by: OBSTETRICS & GYNECOLOGY

## 2019-03-20 PROCEDURE — 770001 HCHG ROOM/CARE - MED/SURG/GYN PRIV*

## 2019-03-20 PROCEDURE — 36415 COLL VENOUS BLD VENIPUNCTURE: CPT

## 2019-03-20 RX ADMIN — ONDANSETRON 4 MG: 2 INJECTION INTRAMUSCULAR; INTRAVENOUS at 15:50

## 2019-03-20 RX ADMIN — DIPHENHYDRAMINE HYDROCHLORIDE 25 MG: 50 INJECTION INTRAMUSCULAR; INTRAVENOUS at 15:49

## 2019-03-20 RX ADMIN — FERROUS GLUCONATE 324 MG: 324 TABLET ORAL at 06:00

## 2019-03-20 RX ADMIN — HYDROMORPHONE HYDROCHLORIDE 0.5 MG: 1 INJECTION, SOLUTION INTRAMUSCULAR; INTRAVENOUS; SUBCUTANEOUS at 14:41

## 2019-03-20 RX ADMIN — HYDROMORPHONE HYDROCHLORIDE 0.5 MG: 1 INJECTION, SOLUTION INTRAMUSCULAR; INTRAVENOUS; SUBCUTANEOUS at 18:23

## 2019-03-20 RX ADMIN — TRAZODONE HYDROCHLORIDE 50 MG: 50 TABLET ORAL at 22:47

## 2019-03-20 RX ADMIN — FERROUS GLUCONATE 324 MG: 324 TABLET ORAL at 17:16

## 2019-03-20 RX ADMIN — HYDROMORPHONE HYDROCHLORIDE 0.5 MG: 1 INJECTION, SOLUTION INTRAMUSCULAR; INTRAVENOUS; SUBCUTANEOUS at 09:41

## 2019-03-20 RX ADMIN — HYDROMORPHONE HYDROCHLORIDE 0.5 MG: 1 INJECTION, SOLUTION INTRAMUSCULAR; INTRAVENOUS; SUBCUTANEOUS at 01:53

## 2019-03-20 RX ADMIN — HYDROMORPHONE HYDROCHLORIDE 0.5 MG: 1 INJECTION, SOLUTION INTRAMUSCULAR; INTRAVENOUS; SUBCUTANEOUS at 22:47

## 2019-03-20 RX ADMIN — OXYCODONE HYDROCHLORIDE 10 MG: 5 TABLET ORAL at 21:25

## 2019-03-20 RX ADMIN — OXYCODONE HYDROCHLORIDE 10 MG: 5 TABLET ORAL at 00:05

## 2019-03-20 RX ADMIN — ACETAMINOPHEN 1000 MG: 500 TABLET ORAL at 00:06

## 2019-03-20 RX ADMIN — KETOROLAC TROMETHAMINE 30 MG: 30 INJECTION, SOLUTION INTRAMUSCULAR; INTRAVENOUS at 12:50

## 2019-03-20 RX ADMIN — DOCUSATE SODIUM 100 MG: 100 CAPSULE, LIQUID FILLED ORAL at 17:16

## 2019-03-20 RX ADMIN — HYDROMORPHONE HYDROCHLORIDE 0.5 MG: 1 INJECTION, SOLUTION INTRAMUSCULAR; INTRAVENOUS; SUBCUTANEOUS at 06:00

## 2019-03-20 RX ADMIN — ACETAMINOPHEN 1000 MG: 500 TABLET ORAL at 17:16

## 2019-03-20 RX ADMIN — KETOROLAC TROMETHAMINE 30 MG: 30 INJECTION, SOLUTION INTRAMUSCULAR; INTRAVENOUS at 00:06

## 2019-03-20 RX ADMIN — OXYCODONE HYDROCHLORIDE 10 MG: 5 TABLET ORAL at 12:50

## 2019-03-20 RX ADMIN — OXYCODONE HYDROCHLORIDE 10 MG: 5 TABLET ORAL at 08:49

## 2019-03-20 RX ADMIN — KETOROLAC TROMETHAMINE 30 MG: 30 INJECTION, SOLUTION INTRAMUSCULAR; INTRAVENOUS at 06:00

## 2019-03-20 RX ADMIN — OXYCODONE HYDROCHLORIDE 10 MG: 5 TABLET ORAL at 17:17

## 2019-03-20 RX ADMIN — DOCUSATE SODIUM 100 MG: 100 CAPSULE, LIQUID FILLED ORAL at 06:00

## 2019-03-20 RX ADMIN — ACETAMINOPHEN 1000 MG: 500 TABLET ORAL at 12:50

## 2019-03-20 RX ADMIN — OXYCODONE HYDROCHLORIDE 10 MG: 5 TABLET ORAL at 04:01

## 2019-03-20 NOTE — OP REPORT
DATE OF SERVICE:  2019    PREOPERATIVE DIAGNOSES:  1.  Symptomatic fibroid uterus, unresponsive to medical management.  2.  Dysfunctional uterine bleeding.  3.  Acute blood loss anemia.  4.  Previous  x5 with bilateral tubal ligation.    POSTOPERATIVE DIAGNOSES:  1.  Symptomatic fibroid uterus, unresponsive to medical management.  2.  Dysfunctional uterine bleeding.  3.  Acute blood loss anemia.  4.  Previous  x5 with bilateral tubal ligation.    PROCEDURES:  Total abdominal hysterectomy with bilateral salpingectomy.    SURGEON:  Miranda Donato MD    ASSISTANT:  Sarai Ayala MD    ANESTHESIOLOGIST:  Melanie Carrion MD and Aureliano Pinedo MD    ANESTHESIA:  General.    COMPLICATIONS:  None.    ESTIMATED BLOOD LOSS:  200 mL    FINDINGS:  Approximately 8-week sized uterus.  There was lots of scarring   around her bladder flap area where her  was.  Bilateral normal   ovaries, bilateral normal appearing fallopian tubes, except there was evidence   of her tubal ligation present.    PATHOLOGY:  Bilateral fallopian tubes as well as uterus and cervix sent.    DISPOSITION:  Patient went to recovery room stable.    DESCRIPTION OF PROCEDURE:  Patient was taken to the operating room where Dr. Carrion placed her under general anesthesia.  She also had an art line placed   secondary to how low her hemoglobin was at the start and how much blood loss   she has had with her uterine bleeding.  She was prepped and draped in a dorsal   supine position.  Time-out was called to identify correct patient, correct   procedure, and confirm she had gotten preoperative antibiotics.  A   Pfannenstiel skin incision was made through her prior one and carried down to   the underlying layer of fascia with a knife.  The fascia was incised in the   midline and incision was carried out laterally with Miranda scissors.  The upper   edge of the fascia was grasped with Kocher clamps, tented up, and the    underlying rectus muscles dissected off bluntly.  The lower edge of the fascia   was grasped with Kocher clamps, tented up, and the underlying rectus muscle   dissected off bluntly.  The rectus muscles were  in the midline.  The   peritoneum was identified and entered and the incision was extended.  The   Newkirk retractor was then placed without any difficulty and large lap was   then placed for packing and bladder blade was then also placed.  Uterus was   visualized and it was approximately 8-week size, normal, exterior appearing.    Bilateral normal fallopian tubes and ovaries were visualized.  There was only   a small defect where her tubal ligation had evidence of it.  The cornua were   retracted using Dayna clamps and the uterus was elevated.  The fallopian tube   was then grasped, followed up to the fimbria, undermined with LigaSure device   along the length of the fallopian tube until the entire tube was removed and   sent for pathology.  Similarly on the right side, the fallopian tube was   grasped, followed up to the fimbria, undermined with the LigaSure device, and   followed along the length of the fallopian tube to the cornua and then it was   removed and sent for pathology.  Bilateral ovaries did appear normal and she   will retain those.      The round ligament on the left was then grasped and   divided using LigaSure device and the peritoneum was opened laterally to the   infundibulopelvic ligaments.  The anterior leaf of the broad ligament was then   divided and the bladder flap was adherent to the bladder flap from her   C-sections, which was carefully dissected down.  Similarly on the right side,   the round ligament was grasped and LigaSure device was cauterized, cut and   sealed with it.  Peritoneum was opened laterally to the infundibulopelvic   ligaments.  The ureters were identified and found to be low in the pelvis   and clear off the surgical field.  An avascular window was then  sounded in the   posterior leaf of the broad ligament and the hole was opened and the   infundibulopelvic ligament was clamped, cauterized, and sealed with the   LigaSure device on both sides.  Once the anterior leaf of the broad ligament   was completely divided and the bladder was carefully taken down as there was a   lot of scar tissue again along bladder flap from her   scar.  The uterine vessels   bilaterally were skeletonized.  Uterine vessels were then bilaterally clamped   with the LigaSure device, sealed and cut.  The cardinal ligaments similarly   were bilaterally clamped, sealed and cut with the LigaSure device as well.    One further pedicle was required in order to enter the vagina.  Pepe clamps   were placed along the angles and using Latha scissors, the uterus with the   cervix was able to be removed.  The angles were suture ligated and tagged and   intermittent figure-of-eight sutures were placed along the length of the   vaginal cuff with 0 Vicryl.  All pedicles were then sequentially checked for   hemostasis, which was excellent.  There was a small amount of Toyin placed on   the peritoneum where there was a small amount of bleeding.  The abdomen was   copiously irrigated and no active bleeding was seen.  Once bleeding was   confirmed hemostatic and reassuring, the bowel pack was then removed, the   bladder blade was then removed, and the peritoneum was then closed in a   running suture of 3-0 Vicryl.  The fascia was closed with 0 Vicryl from either   end and tied in the middle.  The incision was irrigated.  The subcutaneous   layer was closed with 3-0 Vicryl and the skin was closed with Monocryl.    Sponge, laps, and needle counts were correct x3.  Patient was extubated and   taken to recovery room stable.       ____________________________________     MD JENNI MARK / PEYTON    DD:  2019 16:27:38  DT:  2019 18:26:44    D#:  [id number]  Job#:  [id number]

## 2019-03-20 NOTE — PROGRESS NOTES
Bedside report received.  Assessment complete.  A&O x 4. Patient calls appropriately.  Patient up with x1 assist.    Patient has 8/10 pain. Medication given (see MAR)  Denies N&V. Tolerating regualr diet.  Incision to low abdomen with dressing, CDI.   + void via marcos, + flatus, - BM.  Patient denies SOB.  SCD's off.    Review plan with of care with patient. Call light and personal belongings with in reach. Hourly rounding in place. All needs met at this time.

## 2019-03-20 NOTE — PROGRESS NOTES
POD# 1 S/P AMBER    S:  Ambulating, jillian diet, marcos just came out. Pain controlled but very sore.she is still soaking a few pads - more than expected  O:  Pulse: 62, Heart Rate (Monitored): 91  Blood Pressure: (!) 90/58, NIBP: 117/61     Temp  Av °C (98.6 °F)  Min: 36.6 °C (97.8 °F)  Max: 37.9 °C (100.3 °F)   gen: no acute distress  Abd: soft nt/nd   Inc: c/d/i with mepelex dressing  Ext: no calf pain    visusalized her pads: it is more than I expected too. Could be left over clot that was vaginal since hysterectomy was done abdominally. No vaginal exam was done during surgery. Gauze was placed vaginally and dark blood/small clot returned. Repeat gauze had much less dark blood on it.     Intake/Output Summary (Last 24 hours) at 19 0850  Last data filed at 19 0645   Gross per 24 hour   Intake             1880 ml   Output             1550 ml   Net              330 ml     A:  POD# 1 s/p AMBER    More bleeding than I expected likely was clot/blood vaginally that just slowly still is coming out. Will have her shower today/watch bleeding. continue pain control. H/h stable. Amubulate. Will anticipate dc tomorrow.

## 2019-03-20 NOTE — CARE PLAN
Problem: Communication  Goal: The ability to communicate needs accurately and effectively will improve  Outcome: PROGRESSING AS EXPECTED  Review plan of care with patient, encourage patient to ask questions/voice concerns.     Problem: Pain Management  Goal: Pain level will decrease to patient's comfort goal  Outcome: PROGRESSING AS EXPECTED  Assess pain Q2-4H, administer pain medication as ordered, offer patient heat and ice packs

## 2019-03-20 NOTE — PROGRESS NOTES
Bedside report received   Patient is alert and oriented  Denies needs at this time   Resp even and unlabored  Incision to low abdomen with drsg CDI  Tolerating po diet well   Patient denies SOB  Call light within reach, bed is locked in lowest position

## 2019-03-20 NOTE — OR NURSING
1725- assumed care pt nauseated quese ease given which is helping   Friend to bedside   1805 ready to go walter to room report called to k[facility] on [facility] transport requested   1816- returned to room with transport

## 2019-03-21 VITALS
HEIGHT: 66 IN | HEART RATE: 71 BPM | DIASTOLIC BLOOD PRESSURE: 40 MMHG | SYSTOLIC BLOOD PRESSURE: 87 MMHG | BODY MASS INDEX: 29.87 KG/M2 | WEIGHT: 185.85 LBS | TEMPERATURE: 97.1 F | RESPIRATION RATE: 16 BRPM | OXYGEN SATURATION: 97 %

## 2019-03-21 PROBLEM — N92.0 MENORRHAGIA: Status: RESOLVED | Noted: 2019-03-19 | Resolved: 2019-03-21

## 2019-03-21 PROBLEM — D50.0 IRON DEFICIENCY ANEMIA DUE TO CHRONIC BLOOD LOSS: Status: ACTIVE | Noted: 2019-03-21

## 2019-03-21 PROBLEM — D25.0 SUBMUCOUS LEIOMYOMA OF UTERUS: Status: ACTIVE | Noted: 2019-03-21

## 2019-03-21 PROBLEM — D25.0 SUBMUCOUS LEIOMYOMA OF UTERUS: Status: RESOLVED | Noted: 2019-03-21 | Resolved: 2019-03-21

## 2019-03-21 PROCEDURE — 700112 HCHG RX REV CODE 229: Performed by: OBSTETRICS & GYNECOLOGY

## 2019-03-21 PROCEDURE — 700111 HCHG RX REV CODE 636 W/ 250 OVERRIDE (IP): Performed by: OBSTETRICS & GYNECOLOGY

## 2019-03-21 PROCEDURE — A9270 NON-COVERED ITEM OR SERVICE: HCPCS | Performed by: OBSTETRICS & GYNECOLOGY

## 2019-03-21 PROCEDURE — 700102 HCHG RX REV CODE 250 W/ 637 OVERRIDE(OP): Performed by: OBSTETRICS & GYNECOLOGY

## 2019-03-21 PROCEDURE — 700105 HCHG RX REV CODE 258: Performed by: OBSTETRICS & GYNECOLOGY

## 2019-03-21 PROCEDURE — 700105 HCHG RX REV CODE 258

## 2019-03-21 RX ORDER — DIPHENHYDRAMINE HYDROCHLORIDE 50 MG/ML
25 INJECTION INTRAMUSCULAR; INTRAVENOUS ONCE
Status: COMPLETED | OUTPATIENT
Start: 2019-03-21 | End: 2019-03-21

## 2019-03-21 RX ORDER — ACETAMINOPHEN 325 MG/1
650 TABLET ORAL ONCE
Status: COMPLETED | OUTPATIENT
Start: 2019-03-21 | End: 2019-03-21

## 2019-03-21 RX ORDER — DIPHENHYDRAMINE HCL 25 MG
25 TABLET ORAL ONCE
Status: COMPLETED | OUTPATIENT
Start: 2019-03-21 | End: 2019-03-21

## 2019-03-21 RX ORDER — IBUPROFEN 600 MG/1
600 TABLET ORAL EVERY 6 HOURS
Qty: 30 TAB | Refills: 1 | Status: SHIPPED | OUTPATIENT
Start: 2019-03-21 | End: 2019-03-21

## 2019-03-21 RX ORDER — OXYCODONE HYDROCHLORIDE AND ACETAMINOPHEN 5; 325 MG/1; MG/1
1-2 TABLET ORAL EVERY 4 HOURS PRN
Qty: 30 TAB | Refills: 0 | Status: SHIPPED | OUTPATIENT
Start: 2019-03-21 | End: 2019-03-28

## 2019-03-21 RX ORDER — IBUPROFEN 600 MG/1
600 TABLET ORAL EVERY 6 HOURS
Status: DISCONTINUED | OUTPATIENT
Start: 2019-03-21 | End: 2019-03-21 | Stop reason: HOSPADM

## 2019-03-21 RX ORDER — OXYCODONE HYDROCHLORIDE AND ACETAMINOPHEN 5; 325 MG/1; MG/1
1-2 TABLET ORAL EVERY 4 HOURS PRN
Status: DISCONTINUED | OUTPATIENT
Start: 2019-03-21 | End: 2019-03-21 | Stop reason: HOSPADM

## 2019-03-21 RX ORDER — IBUPROFEN 600 MG/1
600 TABLET ORAL EVERY 6 HOURS
Qty: 30 TAB | Refills: 1 | Status: SHIPPED | OUTPATIENT
Start: 2019-03-21

## 2019-03-21 RX ORDER — SODIUM CHLORIDE 9 MG/ML
INJECTION, SOLUTION INTRAVENOUS
Status: COMPLETED
Start: 2019-03-21 | End: 2019-03-21

## 2019-03-21 RX ADMIN — OXYCODONE AND ACETAMINOPHEN 2 TABLET: 5; 325 TABLET ORAL at 17:45

## 2019-03-21 RX ADMIN — ACETAMINOPHEN 1000 MG: 500 TABLET ORAL at 00:31

## 2019-03-21 RX ADMIN — DOCUSATE SODIUM 100 MG: 100 CAPSULE, LIQUID FILLED ORAL at 06:03

## 2019-03-21 RX ADMIN — OXYCODONE HYDROCHLORIDE 10 MG: 5 TABLET ORAL at 00:32

## 2019-03-21 RX ADMIN — SODIUM CHLORIDE 1000 ML: 9 INJECTION, SOLUTION INTRAVENOUS at 10:10

## 2019-03-21 RX ADMIN — IBUPROFEN 600 MG: 600 TABLET ORAL at 12:42

## 2019-03-21 RX ADMIN — IBUPROFEN 600 MG: 600 TABLET ORAL at 09:29

## 2019-03-21 RX ADMIN — POLYETHYLENE GLYCOL 3350 1 PACKET: 17 POWDER, FOR SOLUTION ORAL at 06:02

## 2019-03-21 RX ADMIN — ACETAMINOPHEN 650 MG: 325 TABLET, FILM COATED ORAL at 09:28

## 2019-03-21 RX ADMIN — ACETAMINOPHEN 1000 MG: 500 TABLET ORAL at 06:03

## 2019-03-21 RX ADMIN — FERROUS GLUCONATE 324 MG: 324 TABLET ORAL at 06:03

## 2019-03-21 RX ADMIN — SODIUM CHLORIDE 25 MG: 9 INJECTION, SOLUTION INTRAVENOUS at 10:06

## 2019-03-21 RX ADMIN — SODIUM CHLORIDE 1675 MG: 9 INJECTION, SOLUTION INTRAVENOUS at 12:39

## 2019-03-21 RX ADMIN — DIPHENHYDRAMINE HYDROCHLORIDE 25 MG: 50 INJECTION INTRAMUSCULAR; INTRAVENOUS at 09:27

## 2019-03-21 RX ADMIN — HYDROMORPHONE HYDROCHLORIDE 0.5 MG: 1 INJECTION, SOLUTION INTRAMUSCULAR; INTRAVENOUS; SUBCUTANEOUS at 03:13

## 2019-03-21 RX ADMIN — DOCUSATE SODIUM 100 MG: 100 CAPSULE, LIQUID FILLED ORAL at 17:14

## 2019-03-21 RX ADMIN — OXYCODONE AND ACETAMINOPHEN 2 TABLET: 5; 325 TABLET ORAL at 13:39

## 2019-03-21 RX ADMIN — FERROUS GLUCONATE 324 MG: 324 TABLET ORAL at 17:14

## 2019-03-21 RX ADMIN — OXYCODONE HYDROCHLORIDE 10 MG: 5 TABLET ORAL at 06:03

## 2019-03-21 RX ADMIN — OXYCODONE AND ACETAMINOPHEN 2 TABLET: 5; 325 TABLET ORAL at 09:28

## 2019-03-21 RX ADMIN — IBUPROFEN 600 MG: 600 TABLET ORAL at 17:14

## 2019-03-21 NOTE — PROGRESS NOTES
Bedside report received.  Assessment complete.  A&O x 4. Patient calls appropriately.  Patient up with no assist.   Patient has 8/10 pain. Medication not available at this time.   Denies N&V. Tolerating regular diet.  Pelvic incision with dressing, CDI.   + void, + flatus, - BM.  Patient denies SOB.  SCD's off.    Review plan with of care with patient. Call light and personal belongings with in reach. Hourly rounding in place. All needs met at this time.

## 2019-03-21 NOTE — CARE PLAN
Problem: Bowel/Gastric:  Goal: Will not experience complications related to bowel motility  Outcome: PROGRESSING SLOWER THAN EXPECTED  Assess bowel sounds and abdomen, adminsiter scheduled colace, administer PRN bowel medications, encourage ambulation     Problem: Pain Management  Goal: Pain level will decrease to patient's comfort goal  Outcome: PROGRESSING AS EXPECTED  Assess pain Q2-4H, administer pain medication when indicated, implement heat

## 2019-03-21 NOTE — DISCHARGE INSTRUCTIONS
Discharge Instructions    Discharged to home by car with friend. Discharged via wheelchair, hospital escort: Yes.  Special equipment needed: Not Applicable    Be sure to schedule a follow-up appointment with your primary care doctor or any specialists as instructed.     Discharge Plan:   Diet Plan: Discussed  Activity Level: Discussed  Confirmed Follow up Appointment: Patient to Call and Schedule Appointment  Confirmed Symptoms Management: Discussed  Medication Reconciliation Updated: Yes  Influenza Vaccine Indication: Not indicated: Previously immunized this influenza season and > 8 years of age    I understand that a diet low in cholesterol, fat, and sodium is recommended for good health. Unless I have been given specific instructions below for another diet, I accept this instruction as my diet prescription.   Other diet: Regular     Special Instructions: None    · Is patient discharged on Warfarin / Coumadin?   No     Abdominal Hysterectomy, Care After  Refer to this sheet in the next few weeks. These instructions provide you with information on caring for yourself after your procedure. Your health care provider may also give you more specific instructions. Your treatment has been planned according to current medical practices, but problems sometimes occur. Call your health care provider if you have any problems or questions after your procedure.   WHAT TO EXPECT AFTER THE PROCEDURE  After your procedure, it is typical to have the following:  · Pain.  · Feeling tired.  · Poor appetite.  · Less interest in sex.  It takes 4-6 weeks to recover from this surgery.   HOME CARE INSTRUCTIONS   · Take pain medicines only as directed by your health care provider. Do not take over-the-counter pain medicines without checking with your health care provider first.   · Change your bandage as directed by your health care provider.  · Return to your health care provider to have your sutures taken out.  · Take showers instead of  baths for 2-3 weeks. Ask your health care provider when it is safe to start showering.   · Do not douche, use tampons, or have sexual intercourse for at least 6 weeks or until your health care provider says you can.    · Follow your health care provider's advice about exercise, lifting, driving, and general activities.  · Get plenty of rest and sleep.    · Do not lift anything heavier than a gallon of milk (about 10 lb [4.5 kg]) for the first month after surgery.  · You can resume your normal diet if your health care provider says it is okay.    · Do not drink alcohol until your health care provider says you can.    · If you are constipated, ask your health care provider if you can take a mild laxative.  · Eating foods high in fiber may also help with constipation. Eat plenty of raw fruits and vegetables, whole grains, and beans.  · Drink enough fluids to keep your urine clear or pale yellow.    · Try to have someone at home with you for the first 1-2 weeks to help around the house.  · Keep all follow-up appointments.  SEEK MEDICAL CARE IF:   · You have chills or fever.  · You have swelling, redness, or pain in the area of your incision that is getting worse.    · You have pus coming from the incision.    · You notice a bad smell coming from the incision or bandage.    · Your incision breaks open.    · You feel dizzy or light-headed.    · You have pain or bleeding when you urinate.    · You have persistent diarrhea.    · You have persistent nausea and vomiting.    · You have abnormal vaginal discharge.    · You have a rash.    · You have any type of abnormal reaction or develop an allergy to your medicine.    · Your pain medicine is not helping.    SEEK IMMEDIATE MEDICAL CARE IF:   · You have a fever and your symptoms suddenly get worse.  · You have severe abdominal pain.  · You have chest pain.  · You have shortness of breath.  · You faint.  · You have pain, swelling, or redness of your leg.  · You have heavy  vaginal bleeding with blood clots.  MAKE SURE YOU:  · Understand these instructions.  · Will watch your condition.  · Will get help right away if you are not doing well or get worse.  ·      This information is not intended to replace advice given to you by your health care provider. Make sure you discuss any questions you have with your health care provider.     Document Released: 07/07/2006 Document Revised: 01/08/2016 Document Reviewed: 10/10/2014  [employer] Interactive Patient Education ©2016 [employer]      No heavy lifting/pulling pushing   Keep bowels soft - use miralax or stool softeners   F/u in 2 weeks   Rx: motrin, percocet. Continue iron supplements        Depression / Suicide Risk    As you are discharged from this [facility] facility, it is important to learn how to keep safe from harming yourself.    Recognize the warning signs:  · Abrupt changes in personality, positive or negative- including increase in energy   · Giving away possessions  · Change in eating patterns- significant weight changes-  positive or negative  · Change in sleeping patterns- unable to sleep or sleeping all the time   · Unwillingness or inability to communicate  · Depression  · Unusual sadness, discouragement and loneliness  · Talk of wanting to die  · Neglect of personal appearance   · Rebelliousness- reckless behavior  · Withdrawal from people/activities they love  · Confusion- inability to concentrate     If you or a loved one observes any of these behaviors or has concerns about self-harm, here's what you can do:  · Talk about it- your feelings and reasons for harming yourself  · Remove any means that you might use to hurt yourself (examples: pills, rope, extension cords, firearm)  · Get professional help from the community (Mental Health, Substance Abuse, psychological counseling)  · Do not be alone:Call your Safe Contact- someone whom you trust who will be there for you.  · Call your local CRISIS HOTLINE 197-7185 or  418-046-6292  · Call your local Children's Mobile Crisis Response Team [place] (757) 721-5087 or www.[employer].Zuznow  · Call the toll free National Suicide Prevention Hotlines   · National Suicide Prevention Lifeline 900-236-BFBL (2982)  · National IP Commerce Line Network 800-SUICIDE (086-0072)

## 2019-03-21 NOTE — PROGRESS NOTES
POD# 2 S/P AMBER    S:   She is having pain but controlled w/ meds. She is itching a lot but was even prior to surgery. She wants to have an iron infusion prior to d/c home. Just feels lethargic. She did shower. Ambulating, voiding, no further vaginal bleeding (only spotting after I saw her yesterday).     O:  Pulse: 75  Blood Pressure: (!) 94/50 (nurse notified)     Temp  Av.4 °C (97.5 °F)  Min: 36.2 °C (97.2 °F)  Max: 36.6 °C (97.8 °F)  Gen: no distress  Abd: soft nt/nd  Inc: c/d/i with mepelex dressing  Ext: no calf pain cali LE    Intake/Output Summary (Last 24 hours) at 19 0712  Last data filed at 19 0000   Gross per 24 hour   Intake             1760 ml   Output                0 ml   Net             1760 ml     A:  Pod 2    P:  D/c home later this pm after iron infusion  Pelvic rest  No heavy lifting/pulling/pushing  F/u in 2 weeks in office; call for appt  Rx: motrin/percocet/continue stool softeners and iron

## 2019-03-21 NOTE — DISCHARGE SUMMARY
DATE OF ADMISSION:  03/15/2019    DATE OF DISCHARGE:  2019    PRINCIPAL DIAGNOSIS:  A 39-year-old  5, para 5, with symptomatic   fibroid uterus and acute blood loss anemia and menorrhagia resistant to   medication.    HOSPITAL COURSE:  Patient was admitted.  She was placed on IV Premarin and   transfused several units of packed red blood cells.  She initially received 3   units in the past week prior to this admission.  She had received a total of 3   other units of packed red blood cells secondary to much vaginal bleeding she   was having.  She eventually underwent a total abdominal hysterectomy, which   went uneventful.  Pathology is still pending.  Postoperatively, she did well.    By postoperative day #2, she is ambulating, voiding, tolerating regular diet.    Pain is controlled with p.o. pain medication.  She will be discharged home.    Prior to being discharged home, she will receive iron infusion.  She is still   anemic with hematocrit of 27.4.  She will be sent home after iron infusion is   completed.  Verbal instructions given and all questions were answered.  She   will follow up with me in 2 weeks, sooner if needed.  She is receiving a total   of 5 units of packed red blood cells for this hospitalization and iron   infusion by the end of this day.       ____________________________________     MD JENNI MARK / PEYTON    DD:  2019 07:31:20  DT:  2019 14:10:11    D#:  [id number]  Job#:  [id number]

## 2019-03-21 NOTE — PROGRESS NOTES
IV Iron Per Pharmacy Note    Patient Lean Body Weight:  59.3 kg  Reason for Iron Replacement:  Acute blood loss, blood transfusion requirements      Lab Results   Component Value Date/Time    WBC 11.2 (H) 03/20/2019 03:17 AM    RBC 3.16 (L) 03/20/2019 03:17 AM    HEMOGLOBIN 8.2 (L) 03/20/2019 03:17 AM    HEMATOCRIT 27.4 (L) 03/20/2019 03:17 AM    MCV 86.7 03/20/2019 03:17 AM    MCH 25.9 (L) 03/20/2019 03:17 AM    MCHC 29.9 (L) 03/20/2019 03:17 AM       Assessment/Plan:  1. IV Iron requested  2. Give Iron Dextran 25 mg IV test dose following diphenhydramine/acetaminophen premeds over 30 minutes per protocol.  3. If no reaction (Anaphylaxis, Hypotension/Hypertension, N/V/D, Chest pain/Back Pain, Urticaria/Pruritis) in the next hour, proceed to full dose. Nursing to call the pharmacy IV room at ext. [phone] for full dose.  4. Full dose: Iron Dextran 1675 mg IV over 4 hours. Continue to monitor for delayed ADR including: Arthralgia/myalgia, Headache/backache, chills/dizziness/malaise, moderate to high fever and n/v.      Mariaa Vázquez, PharmD, BCCCP

## 2019-03-21 NOTE — PROGRESS NOTES
Bedside report received   Patient alert and oriented   Denies needs   Skin warm and dry to touch  Denies nausea or vomiting at this time  Pelvic incision with drsg CDI  Call light within reach,bed is locked in lowest position

## (undated) DEVICE — LIGASURE TISSUE FUSION  - SINGLE USE (6/CA)

## (undated) DEVICE — KIT ANESTHESIA W/CIRCUIT & 3/LT BAG W/FILTER (20EA/CA)

## (undated) DEVICE — SUCTION INSTRUMENT YANKAUER BULBOUS TIP W/O VENT (50EA/CA)

## (undated) DEVICE — ELECTRODE DUAL RETURN W/ CORD - (50/PK)

## (undated) DEVICE — CLOSURE SKIN STRIP 1/2 X 4 IN - (STERI STRIP) (50/BX 4BX/CA)

## (undated) DEVICE — TRAY BLADDER CARE W/ 16 FR FOLEY CATHETER STATLOCK  (10/CA)

## (undated) DEVICE — SUTURE 0 VICRYL PLUS CT-1 - 8 X 18 INCH (12/BX)

## (undated) DEVICE — ELECTRODE 850 FOAM ADHESIVE - HYDROGEL RADIOTRNSPRNT (50/PK)

## (undated) DEVICE — PAD LAP STERILE 18 X 18 - (5/PK 40PK/CA)

## (undated) DEVICE — SUTURE 4-0 MONOCRYL PLUS PS-2 - 27 INCH (36/BX)

## (undated) DEVICE — HEMOSTAT ARISTA PWD 5 GRAM - (5/BX)

## (undated) DEVICE — GLOVE BIOGEL SZ 6.5 SURGICAL PF LTX (50PR/BX 4BX/CA)

## (undated) DEVICE — SWABSTICK BENZOIN SINGLE (50EA/BX)

## (undated) DEVICE — MASK ANESTHESIA ADULT  - (100/CA)

## (undated) DEVICE — SET LEADWIRE 5 LEAD BEDSIDE DISPOSABLE ECG (1SET OF 5/EA)

## (undated) DEVICE — SODIUM CHL. INJ. 0.9% 500ML (24EA/CA 50CA/PF)

## (undated) DEVICE — BLADE SURGICAL #15 - (50/BX 3BX/CA)

## (undated) DEVICE — TUBE E-T HI-LO CUFF 7.0MM (10EA/PK)

## (undated) DEVICE — GOWN SURGEONS LARGE - (32/CA)

## (undated) DEVICE — TOWELS CLOTH SURGICAL - (4/PK 20PK/CA)

## (undated) DEVICE — GOWN WARMING STANDARD FLEX - (30/CA)

## (undated) DEVICE — CANISTER SUCTION RIGID RED 1500CC (40EA/CA)

## (undated) DEVICE — SPONGE RADIOPAQUE CTN X-LG - STERILE (50PK/CA) MADE TO ORDER ITEM AND HAS A 4-6 WEEK LEAD TIME

## (undated) DEVICE — GLOVE BIOGEL PI ORTHO SZ 6 1/2 SURGICAL PF LF (40PR/BX)

## (undated) DEVICE — SPONGE PEANUT - (5/PK 50PK/CA)

## (undated) DEVICE — GLOVE BIOGEL INDICATOR SZ 6.5 SURGICAL PF LTX - (50PR/BX 4BX/CA)

## (undated) DEVICE — SUTURE 0 VICRYL PLUS CT-1 - 36 INCH (36/BX)

## (undated) DEVICE — GLOVE BIOGEL INDICATOR SZ 7SURGICAL PF LTX - (50/BX 4BX/CA)

## (undated) DEVICE — TUBE CONNECTING SUCTION - CLEAR PLASTIC STERILE 72 IN (50EA/CA)

## (undated) DEVICE — HEAD HOLDER JUNIOR/ADULT

## (undated) DEVICE — CATHETER IV 20 GA X 1-1/4 ---SURG.& SDS ONLY--- (50EA/BX)

## (undated) DEVICE — TRAY SRGPRP PVP IOD WT PRP - (20/CA)

## (undated) DEVICE — CANISTER SUCTION 3000ML MECHANICAL FILTER AUTO SHUTOFF MEDI-VAC NONSTERILE LF DISP  (40EA/CA)

## (undated) DEVICE — SUTURE 3-0 VICRYL PLUS CT-1 - 36 INCH (36/BX)

## (undated) DEVICE — LACTATED RINGERS INJ 1000 ML - (14EA/CA 60CA/PF)

## (undated) DEVICE — TUBING CLEARLINK DUO-VENT - C-FLO (48EA/CA)

## (undated) DEVICE — PACK MAJOR BASIN - (2EA/CA)

## (undated) DEVICE — GLOVE BIOGEL SZ 6 PF LATEX - (50EA/BX 4BX/CA)

## (undated) DEVICE — CHLORAPREP 26 ML APPLICATOR - ORANGE TINT(25/CA)

## (undated) DEVICE — KIT ARTERIAL LINE 20 GA - (10/BX)

## (undated) DEVICE — SUTURE 0 COATED VICRYL 6-18IN - (12PK/BX)

## (undated) DEVICE — PROTECTOR ULNA NERVE - (36PR/CA)

## (undated) DEVICE — MANIFOLD NEPTUNE 1 PORT (20/PK)

## (undated) DEVICE — SENSOR SPO2 NEO LNCS ADHESIVE (20/BX) SEE USER NOTES

## (undated) DEVICE — KIT  I.V. START (100EA/CA)

## (undated) DEVICE — SODIUM CHL IRRIGATION 0.9% 1000ML (12EA/CA)

## (undated) DEVICE — SUTURE 3-0 MONOCRYL PLUS PS-1 - 27 INCH (36/BX)

## (undated) DEVICE — SUTURE GENERAL

## (undated) DEVICE — SLEEVE, VASO, THIGH, MED

## (undated) DEVICE — DRESSING NON-ADHERING 8 X 3 - (50/BX)

## (undated) DEVICE — GOWN SURGEONS X-LARGE - DISP. (30/CA)

## (undated) DEVICE — GLOVE BIOGEL PI INDICATOR SZ 7.0 SURGICAL PF LF - (50/BX 4BX/CA)